# Patient Record
Sex: FEMALE | Race: BLACK OR AFRICAN AMERICAN | NOT HISPANIC OR LATINO | Employment: OTHER | URBAN - METROPOLITAN AREA
[De-identification: names, ages, dates, MRNs, and addresses within clinical notes are randomized per-mention and may not be internally consistent; named-entity substitution may affect disease eponyms.]

---

## 2022-11-14 ENCOUNTER — TELEPHONE (OUTPATIENT)
Dept: PODIATRY | Facility: CLINIC | Age: 67
End: 2022-11-14
Payer: MEDICARE

## 2022-11-14 NOTE — TELEPHONE ENCOUNTER
Patient was called regarding the need to reschedule their November 15, 2022 appointment with . Patient didn't answer but a detailed message was left stating that her appointment will be cancelled with the need of being reschedule.    Appointment was left as is but will be cancelled.

## 2022-12-07 ENCOUNTER — LAB VISIT (OUTPATIENT)
Dept: LAB | Facility: HOSPITAL | Age: 67
End: 2022-12-07
Attending: FAMILY MEDICINE
Payer: MEDICARE

## 2022-12-07 ENCOUNTER — OFFICE VISIT (OUTPATIENT)
Dept: PODIATRY | Facility: CLINIC | Age: 67
End: 2022-12-07
Payer: MEDICARE

## 2022-12-07 VITALS — HEIGHT: 65 IN | BODY MASS INDEX: 36.8 KG/M2 | WEIGHT: 220.88 LBS

## 2022-12-07 DIAGNOSIS — I10 ESSENTIAL HYPERTENSION, MALIGNANT: ICD-10-CM

## 2022-12-07 DIAGNOSIS — Z12.11 SPECIAL SCREENING FOR MALIGNANT NEOPLASMS, COLON: ICD-10-CM

## 2022-12-07 DIAGNOSIS — Q66.70 CAVUS DEFORMITY OF FOOT: Primary | ICD-10-CM

## 2022-12-07 DIAGNOSIS — Z00.01 ENCOUNTER FOR GENERAL ADULT MEDICAL EXAMINATION WITH ABNORMAL FINDINGS: Primary | ICD-10-CM

## 2022-12-07 LAB
ALBUMIN SERPL BCP-MCNC: 4.3 G/DL (ref 3.5–5.2)
ALP SERPL-CCNC: 113 U/L (ref 55–135)
ALT SERPL W/O P-5'-P-CCNC: 12 U/L (ref 10–44)
ANION GAP SERPL CALC-SCNC: 11 MMOL/L (ref 8–16)
AST SERPL-CCNC: 14 U/L (ref 10–40)
BASOPHILS # BLD AUTO: 0.03 K/UL (ref 0–0.2)
BASOPHILS NFR BLD: 0.4 % (ref 0–1.9)
BILIRUB SERPL-MCNC: 0.6 MG/DL (ref 0.1–1)
BUN SERPL-MCNC: 12 MG/DL (ref 8–23)
CALCIUM SERPL-MCNC: 10.1 MG/DL (ref 8.7–10.5)
CHLORIDE SERPL-SCNC: 103 MMOL/L (ref 95–110)
CHOLEST SERPL-MCNC: 219 MG/DL (ref 120–199)
CHOLEST/HDLC SERPL: 5.5 {RATIO} (ref 2–5)
CO2 SERPL-SCNC: 25 MMOL/L (ref 23–29)
CREAT SERPL-MCNC: 0.8 MG/DL (ref 0.5–1.4)
DIFFERENTIAL METHOD: ABNORMAL
EOSINOPHIL # BLD AUTO: 0.1 K/UL (ref 0–0.5)
EOSINOPHIL NFR BLD: 1.1 % (ref 0–8)
ERYTHROCYTE [DISTWIDTH] IN BLOOD BY AUTOMATED COUNT: 13.1 % (ref 11.5–14.5)
EST. GFR  (NO RACE VARIABLE): >60 ML/MIN/1.73 M^2
GLUCOSE SERPL-MCNC: 88 MG/DL (ref 70–110)
HCT VFR BLD AUTO: 42.2 % (ref 37–48.5)
HDLC SERPL-MCNC: 40 MG/DL (ref 40–75)
HDLC SERPL: 18.3 % (ref 20–50)
HGB BLD-MCNC: 13.3 G/DL (ref 12–16)
IMM GRANULOCYTES # BLD AUTO: 0.03 K/UL (ref 0–0.04)
IMM GRANULOCYTES NFR BLD AUTO: 0.4 % (ref 0–0.5)
LDLC SERPL CALC-MCNC: 152.4 MG/DL (ref 63–159)
LYMPHOCYTES # BLD AUTO: 2.7 K/UL (ref 1–4.8)
LYMPHOCYTES NFR BLD: 34.4 % (ref 18–48)
MCH RBC QN AUTO: 28.9 PG (ref 27–31)
MCHC RBC AUTO-ENTMCNC: 31.5 G/DL (ref 32–36)
MCV RBC AUTO: 92 FL (ref 82–98)
MONOCYTES # BLD AUTO: 0.7 K/UL (ref 0.3–1)
MONOCYTES NFR BLD: 8.9 % (ref 4–15)
NEUTROPHILS # BLD AUTO: 4.4 K/UL (ref 1.8–7.7)
NEUTROPHILS NFR BLD: 54.8 % (ref 38–73)
NONHDLC SERPL-MCNC: 179 MG/DL
NRBC BLD-RTO: 0 /100 WBC
PLATELET # BLD AUTO: 433 K/UL (ref 150–450)
PMV BLD AUTO: 10.1 FL (ref 9.2–12.9)
POTASSIUM SERPL-SCNC: 3.9 MMOL/L (ref 3.5–5.1)
PROT SERPL-MCNC: 8.4 G/DL (ref 6–8.4)
RBC # BLD AUTO: 4.61 M/UL (ref 4–5.4)
SODIUM SERPL-SCNC: 139 MMOL/L (ref 136–145)
TRIGL SERPL-MCNC: 133 MG/DL (ref 30–150)
TSH SERPL DL<=0.005 MIU/L-ACNC: 2.3 UIU/ML (ref 0.4–4)
WBC # BLD AUTO: 7.94 K/UL (ref 3.9–12.7)

## 2022-12-07 PROCEDURE — 99203 OFFICE O/P NEW LOW 30 MIN: CPT | Mod: S$GLB,,, | Performed by: PODIATRIST

## 2022-12-07 PROCEDURE — 82272 OCCULT BLD FECES 1-3 TESTS: CPT | Mod: HCWC

## 2022-12-07 PROCEDURE — 1159F MED LIST DOCD IN RCRD: CPT | Mod: CPTII,S$GLB,, | Performed by: PODIATRIST

## 2022-12-07 PROCEDURE — 84443 ASSAY THYROID STIM HORMONE: CPT | Mod: HCWC | Performed by: FAMILY MEDICINE

## 2022-12-07 PROCEDURE — 99203 PR OFFICE/OUTPT VISIT, NEW, LEVL III, 30-44 MIN: ICD-10-PCS | Mod: S$GLB,,, | Performed by: PODIATRIST

## 2022-12-07 PROCEDURE — 80061 LIPID PANEL: CPT | Mod: HCWC | Performed by: FAMILY MEDICINE

## 2022-12-07 PROCEDURE — 99999 PR PBB SHADOW E&M-EST. PATIENT-LVL II: CPT | Mod: PBBFAC,HCWC,, | Performed by: PODIATRIST

## 2022-12-07 PROCEDURE — 99999 PR PBB SHADOW E&M-EST. PATIENT-LVL II: ICD-10-PCS | Mod: PBBFAC,HCWC,, | Performed by: PODIATRIST

## 2022-12-07 PROCEDURE — 36415 COLL VENOUS BLD VENIPUNCTURE: CPT | Mod: HCWC | Performed by: FAMILY MEDICINE

## 2022-12-07 PROCEDURE — 1159F PR MEDICATION LIST DOCUMENTED IN MEDICAL RECORD: ICD-10-PCS | Mod: CPTII,S$GLB,, | Performed by: PODIATRIST

## 2022-12-07 PROCEDURE — 3008F BODY MASS INDEX DOCD: CPT | Mod: CPTII,S$GLB,, | Performed by: PODIATRIST

## 2022-12-07 PROCEDURE — 85025 COMPLETE CBC W/AUTO DIFF WBC: CPT | Mod: HCWC | Performed by: FAMILY MEDICINE

## 2022-12-07 PROCEDURE — 3008F PR BODY MASS INDEX (BMI) DOCUMENTED: ICD-10-PCS | Mod: CPTII,S$GLB,, | Performed by: PODIATRIST

## 2022-12-07 PROCEDURE — 80053 COMPREHEN METABOLIC PANEL: CPT | Mod: HCWC | Performed by: FAMILY MEDICINE

## 2022-12-07 NOTE — PROGRESS NOTES
Subjective:      Patient ID: Jailyn Braun is a 67 y.o. female.    Chief Complaint: Ingrown Toenail (Bilateral ingrown toenail pain ), Plantar Fasciitis (Bilateral pain. ), and Ankle Pain    Jailyn is a 67 y.o. female who presents to the podiatry clinic  with complaint of  bilateral foot pain. Onset of the symptoms was several years ago. Precipitating event: none known. Current symptoms include: ability to bear weight, but with some pain. Aggravating factors: any weight bearing. Symptoms have waxed and waned. Patient has had prior foot problems. Evaluation to date: none. Treatment to date: none. Patients rates pain 4/10 on pain scale.    Review of Systems   Constitutional: Negative for chills, fever and malaise/fatigue.   HENT:  Negative for hearing loss.    Cardiovascular:  Positive for leg swelling. Negative for claudication.   Respiratory:  Negative for shortness of breath.    Skin:  Negative for flushing and rash.   Musculoskeletal:  Positive for joint pain. Negative for myalgias.   Neurological:  Negative for loss of balance, numbness, paresthesias and sensory change.   Psychiatric/Behavioral:  Negative for altered mental status.          Objective:      Physical Exam  Vitals reviewed.   Cardiovascular:      Pulses:           Dorsalis pedis pulses are 2+ on the right side and 2+ on the left side.        Posterior tibial pulses are 2+ on the right side and 2+ on the left side.      Comments: No edema noted b/L  Musculoskeletal:      Comments: Increased height of medial arches noted b/L           Feet:      Right foot:      Protective Sensation: 5 sites tested.  5 sites sensed.      Left foot:      Protective Sensation: 5 sites tested.  5 sites sensed.   Skin:     Capillary Refill: Capillary refill takes 2 to 3 seconds.      Comments: Normal skin tugor noted.   No open lesion noted b/L  Skin temp is warm to warm from proximal to distal b/L.  Webspaces clean, dry, and intact     Neurological:      Mental Status:  She is alert and oriented to person, place, and time.      Comments: Gross sensation intact b/L           Assessment:       Encounter Diagnosis   Name Primary?    Cavus deformity of foot Yes         Plan:       Jailyn was seen today for ingrown toenail, plantar fasciitis and ankle pain.    Diagnoses and all orders for this visit:    Cavus deformity of foot  -     ORTHOTIC DEVICE (DME)      I counseled the patient on her conditions, their implications and medical management.    Pt advised that the pain in her feet is likely caused by cavus foot type.   Rx custom orthotics   Shoe modification advised for the pt. Pt was advised to obtain shoes will accommodate foot deformities.   Call or return to clinic prn if these symptoms worsen or fail to improve as anticipated.      .

## 2023-01-07 LAB — OB PNL STL: NEGATIVE

## 2023-01-31 ENCOUNTER — OFFICE VISIT (OUTPATIENT)
Dept: SPINE | Facility: CLINIC | Age: 68
End: 2023-01-31
Payer: MEDICARE

## 2023-01-31 VITALS
SYSTOLIC BLOOD PRESSURE: 159 MMHG | OXYGEN SATURATION: 97 % | HEART RATE: 80 BPM | HEIGHT: 65 IN | DIASTOLIC BLOOD PRESSURE: 75 MMHG | BODY MASS INDEX: 36.48 KG/M2 | WEIGHT: 218.94 LBS

## 2023-01-31 DIAGNOSIS — M50.30 DDD (DEGENERATIVE DISC DISEASE), CERVICAL: ICD-10-CM

## 2023-01-31 DIAGNOSIS — M54.6 CHRONIC RIGHT-SIDED THORACIC BACK PAIN: ICD-10-CM

## 2023-01-31 DIAGNOSIS — M54.2 CERVICALGIA: Primary | ICD-10-CM

## 2023-01-31 DIAGNOSIS — M47.812 SPONDYLOSIS OF CERVICAL REGION WITHOUT MYELOPATHY OR RADICULOPATHY: ICD-10-CM

## 2023-01-31 DIAGNOSIS — G89.29 CHRONIC RIGHT-SIDED THORACIC BACK PAIN: ICD-10-CM

## 2023-01-31 DIAGNOSIS — M79.18 MYOFASCIAL PAIN: ICD-10-CM

## 2023-01-31 PROCEDURE — 99204 PR OFFICE/OUTPT VISIT, NEW, LEVL IV, 45-59 MIN: ICD-10-PCS | Mod: HCWC,S$GLB,, | Performed by: NURSE PRACTITIONER

## 2023-01-31 PROCEDURE — 3008F BODY MASS INDEX DOCD: CPT | Mod: HCWC,CPTII,S$GLB, | Performed by: NURSE PRACTITIONER

## 2023-01-31 PROCEDURE — 1125F AMNT PAIN NOTED PAIN PRSNT: CPT | Mod: HCWC,CPTII,S$GLB, | Performed by: NURSE PRACTITIONER

## 2023-01-31 PROCEDURE — 1125F PR PAIN SEVERITY QUANTIFIED, PAIN PRESENT: ICD-10-PCS | Mod: HCWC,CPTII,S$GLB, | Performed by: NURSE PRACTITIONER

## 2023-01-31 PROCEDURE — 99999 PR PBB SHADOW E&M-EST. PATIENT-LVL IV: CPT | Mod: PBBFAC,HCWC,, | Performed by: NURSE PRACTITIONER

## 2023-01-31 PROCEDURE — 1101F PT FALLS ASSESS-DOCD LE1/YR: CPT | Mod: HCWC,CPTII,S$GLB, | Performed by: NURSE PRACTITIONER

## 2023-01-31 PROCEDURE — 1101F PR PT FALLS ASSESS DOC 0-1 FALLS W/OUT INJ PAST YR: ICD-10-PCS | Mod: HCWC,CPTII,S$GLB, | Performed by: NURSE PRACTITIONER

## 2023-01-31 PROCEDURE — 1160F RVW MEDS BY RX/DR IN RCRD: CPT | Mod: HCWC,CPTII,S$GLB, | Performed by: NURSE PRACTITIONER

## 2023-01-31 PROCEDURE — 3288F PR FALLS RISK ASSESSMENT DOCUMENTED: ICD-10-PCS | Mod: HCWC,CPTII,S$GLB, | Performed by: NURSE PRACTITIONER

## 2023-01-31 PROCEDURE — 3008F PR BODY MASS INDEX (BMI) DOCUMENTED: ICD-10-PCS | Mod: HCWC,CPTII,S$GLB, | Performed by: NURSE PRACTITIONER

## 2023-01-31 PROCEDURE — 1159F PR MEDICATION LIST DOCUMENTED IN MEDICAL RECORD: ICD-10-PCS | Mod: HCWC,CPTII,S$GLB, | Performed by: NURSE PRACTITIONER

## 2023-01-31 PROCEDURE — 3078F PR MOST RECENT DIASTOLIC BLOOD PRESSURE < 80 MM HG: ICD-10-PCS | Mod: HCWC,CPTII,S$GLB, | Performed by: NURSE PRACTITIONER

## 2023-01-31 PROCEDURE — 3077F PR MOST RECENT SYSTOLIC BLOOD PRESSURE >= 140 MM HG: ICD-10-PCS | Mod: HCWC,CPTII,S$GLB, | Performed by: NURSE PRACTITIONER

## 2023-01-31 PROCEDURE — 3288F FALL RISK ASSESSMENT DOCD: CPT | Mod: HCWC,CPTII,S$GLB, | Performed by: NURSE PRACTITIONER

## 2023-01-31 PROCEDURE — 3078F DIAST BP <80 MM HG: CPT | Mod: HCWC,CPTII,S$GLB, | Performed by: NURSE PRACTITIONER

## 2023-01-31 PROCEDURE — 1159F MED LIST DOCD IN RCRD: CPT | Mod: HCWC,CPTII,S$GLB, | Performed by: NURSE PRACTITIONER

## 2023-01-31 PROCEDURE — 99204 OFFICE O/P NEW MOD 45 MIN: CPT | Mod: HCWC,S$GLB,, | Performed by: NURSE PRACTITIONER

## 2023-01-31 PROCEDURE — 1160F PR REVIEW ALL MEDS BY PRESCRIBER/CLIN PHARMACIST DOCUMENTED: ICD-10-PCS | Mod: HCWC,CPTII,S$GLB, | Performed by: NURSE PRACTITIONER

## 2023-01-31 PROCEDURE — 3077F SYST BP >= 140 MM HG: CPT | Mod: HCWC,CPTII,S$GLB, | Performed by: NURSE PRACTITIONER

## 2023-01-31 PROCEDURE — 99999 PR PBB SHADOW E&M-EST. PATIENT-LVL IV: ICD-10-PCS | Mod: PBBFAC,HCWC,, | Performed by: NURSE PRACTITIONER

## 2023-01-31 RX ORDER — TIZANIDINE 4 MG/1
2-4 TABLET ORAL NIGHTLY PRN
Qty: 30 TABLET | Refills: 1 | Status: SHIPPED | OUTPATIENT
Start: 2023-01-31 | End: 2023-04-01

## 2023-01-31 NOTE — PROGRESS NOTES
Subjective:      Patient ID: Jailyn Braun is a 67 y.o. female.    Chief Complaint: No chief complaint on file.    HPI     Past Medical History:   Diagnosis Date    Hypertension        Past Surgical History:   Procedure Laterality Date    HYSTERECTOMY  1996    TONSILLECTOMY         Family History   Problem Relation Age of Onset    Heart disease Mother         chf    Autoimmune disease Mother     Stroke Father     Heart disease Father         mi    Lupus Sister        Social History     Socioeconomic History    Marital status: Single   Occupational History     Employer: OTHER   Tobacco Use    Smoking status: Never    Smokeless tobacco: Never    Tobacco comments:     quit 27 years ago   Substance and Sexual Activity    Alcohol use: Yes     Alcohol/week: 1.0 standard drink     Types: 1 Cans of beer per week     Comment: once a month     Drug use: No       Current Outpatient Medications   Medication Sig Dispense Refill    amlodipine (NORVASC) 5 MG tablet Take 1 tablet (5 mg total) by mouth once daily. 30 tablet 5    celecoxib (CELEBREX) 200 MG capsule 1 tab po q day 30 capsule 2    cyclobenzaprine (FLEXERIL) 10 MG tablet Half-1 tab po qhs for muscle contraction 30 tablet 2    hydrochlorothiazide (HYDRODIURIL) 25 MG tablet Take 25 mg by mouth once daily.       No current facility-administered medications for this visit.       Review of patient's allergies indicates:   Allergen Reactions    Morphine Hives and Rash    Sulfa (sulfonamide antibiotics) Hives and Rash    Codeine Hives and Rash         Review of Systems   Constitutional: Negative for fever.   Cardiovascular:  Negative for chest pain.   Respiratory:  Negative for shortness of breath.    Musculoskeletal:  Positive for back pain (mid back) and neck pain.   Gastrointestinal:  Negative for abdominal pain, bowel incontinence, nausea and vomiting.   Genitourinary:  Negative for bladder incontinence.   Neurological:  Positive for numbness and paresthesias.        Objective:        General: Jailyn is well-developed, well-nourished, appears stated age, in no acute distress, alert and oriented to time, place and person.     General    Vitals reviewed.  Constitutional: She is oriented to person, place, and time. She appears well-developed and well-nourished.   HENT:   Head: Atraumatic.   Nose: Nose normal.   Eyes: Conjunctivae are normal.   Cardiovascular:  Normal rate.            Pulmonary/Chest: Effort normal.   Neurological: She is alert and oriented to person, place, and time.   Psychiatric: She has a normal mood and affect. Her behavior is normal. Judgment and thought content normal.     General Musculoskeletal Exam   Gait: normal     Back (L-Spine & T-Spine) / Neck (C-Spine) Exam     Tenderness   The patient is tender to palpation of the right trapezial and left trapezial. Right paramedian tenderness of the Lower C-Spine and Lower T-Spine. Left paramedian tenderness of the Lower C-Spine.     Neck (C-Spine) Range of Motion   Flexion:      Limited  Extension:  Limited  Right Lateral Bend: abnormal  Left Lateral Bend: abnormal  Right Rotation: abnormal  Left Rotation: abnormal    Spinal Sensation   Right Side Sensation  C-Spine Level: normal   Left Side Sensation  C-Spine Level: normal    Other   She has no scoliosis .  Spinal Kyphosis:  Absent      Muscle Strength   Right Upper Extremity   Biceps: 5/5   Deltoid:  5/5  Triceps:  5/5  : 5/5   Finger Extensors:  5/5  Left Upper Extremity  Biceps: 5/5   Deltoid:  5/5  Triceps:  5/5  :  5/5   Finger Extensors:  5/5  Right Lower Extremity   Hip Flexion: 5/5   Quadriceps:  5/5   Anterior tibial:  5/5   EHL:  5/5  Left Lower Extremity   Hip Flexion: 5/5   Quadriceps:  5/5   Anterior tibial:  5/5   EHL:  5/5    Reflexes     Left Side  Biceps:  2+  Brachioradialis:  2+  Left Ho's Sign:  Absent    Right Side   Biceps:  2+  Brachioradialis:  2+  Right Ho's Sign:  absent    Vascular Exam     Right  Pulses        Carotid:                  2+    Left Pulses        Carotid:                  2+            Assessment:       1. Cervicalgia    2. Chronic right-sided thoracic back pain    3. Spondylosis of cervical region without myelopathy or radiculopathy    4. DDD (degenerative disc disease), cervical    5. Myofascial pain           Plan:          We discussed neck and back pain and the nature of neck and back pain.  We discussed that it is not one thing that causes the pain but an accumulation of multiple things that we do.    Order cervical and thoracic xrays   Referral to physical therapy for evaluation and treatment of neck and midback pain  Rx zanaflex 2-4mg as needed for muscle pain. Cautioned about potential drowsiness  We discussed posture sitting and the importance of trying to sit better.    We discussed the benefits of therapy and exercise and continuing to move.   RTC for followup in 8 weeks    More than 50% of the total time  of 45 minutes was spent face to face in counseling on diagnosis and treatment options. I also counseled patient  on common and most usual side effect of prescribed medications.  I reviewed Primary care , and other specialty's notes to better coordinate patient's care. All questions were answered, and patient voiced understanding.      Follow-up: Follow up in about 8 weeks (around 3/28/2023). If there are any questions prior to this, the patient was instructed to contact the office.

## 2023-02-01 ENCOUNTER — HOSPITAL ENCOUNTER (OUTPATIENT)
Dept: RADIOLOGY | Facility: OTHER | Age: 68
Discharge: HOME | End: 2023-02-01
Attending: NURSE PRACTITIONER
Payer: MEDICARE

## 2023-02-01 DIAGNOSIS — M50.30 DDD (DEGENERATIVE DISC DISEASE), CERVICAL: ICD-10-CM

## 2023-02-01 DIAGNOSIS — M54.2 CERVICALGIA: ICD-10-CM

## 2023-02-01 DIAGNOSIS — G89.29 CHRONIC RIGHT-SIDED THORACIC BACK PAIN: ICD-10-CM

## 2023-02-01 DIAGNOSIS — M47.812 SPONDYLOSIS OF CERVICAL REGION WITHOUT MYELOPATHY OR RADICULOPATHY: ICD-10-CM

## 2023-02-01 DIAGNOSIS — M54.6 CHRONIC RIGHT-SIDED THORACIC BACK PAIN: ICD-10-CM

## 2023-02-01 PROCEDURE — 72052 XR CERVICAL SPINE 5 VIEW WITH FLEX AND EXT: ICD-10-PCS | Mod: 26,HCWC,, | Performed by: RADIOLOGY

## 2023-02-01 PROCEDURE — 72052 X-RAY EXAM NECK SPINE 6/>VWS: CPT | Mod: TC,HCWC,FY

## 2023-02-01 PROCEDURE — 72070 XR THORACIC SPINE AP LATERAL: ICD-10-PCS | Mod: 26,HCWC,, | Performed by: RADIOLOGY

## 2023-02-01 PROCEDURE — 72052 X-RAY EXAM NECK SPINE 6/>VWS: CPT | Mod: 26,HCWC,, | Performed by: RADIOLOGY

## 2023-02-01 PROCEDURE — 72070 X-RAY EXAM THORAC SPINE 2VWS: CPT | Mod: TC,HCWC,FY

## 2023-02-01 PROCEDURE — 72070 X-RAY EXAM THORAC SPINE 2VWS: CPT | Mod: 26,HCWC,, | Performed by: RADIOLOGY

## 2023-02-03 ENCOUNTER — TELEPHONE (OUTPATIENT)
Dept: SPINE | Facility: CLINIC | Age: 68
End: 2023-02-03
Payer: MEDICARE

## 2023-02-03 NOTE — TELEPHONE ENCOUNTER
----- Message from Iliana Crockett sent at 2/3/2023 10:42 AM CST -----  Contact: LAMIN MACE [[id number]]  Type: Call Back      Who called: LAMIN MACE [[id number]]      What is the request in detail: Patient is requesting a call back. Pt states that the medication she is currently taking is not helping with her pain tiZANidine (ZANAFLEX) 4 MG tablet. She states that she is very uncomfortable today. She would like to know if she can get another medication sent over.   Please advise.       Can the clinic reply by [facility]?       Would the patient rather a call back or a response via [facility]?       Best call back number: [phone] (home)       Additional Information: [id number] - [place], [place] - [address]

## 2023-02-03 NOTE — TELEPHONE ENCOUNTER
Contacted Ms. Aparna in regard to her request. There was no answer, left detailed message. Ly is out today. Her request will be presented to her upon her return on Monday and once a response is received she will be contacted.

## 2023-02-07 ENCOUNTER — TELEPHONE (OUTPATIENT)
Dept: SPINE | Facility: CLINIC | Age: 68
End: 2023-02-07
Payer: MEDICARE

## 2023-02-07 NOTE — TELEPHONE ENCOUNTER
----- Message from Ly Kaur NP sent at 2/6/2023  9:11 AM CST -----  Regarding: RE: med change request  We can try adding meloxicam. She needs to get started with PT.     ----- Message -----  From: Verito Rodriguez MA  Sent: 2/6/2023   8:00 AM CST  To: Ly Kaur NP  Subject: med change request                               Who called: LAMIN MACE [[id number]]        What is the request in detail: Patient is requesting a call back. Pt states that the medication she is currently taking is not helping with her pain tiZANidine (ZANAFLEX) 4 MG tablet. She states that she is very uncomfortable today. She would like to know if she can get another medication sent over.   Please advise.         Can the clinic reply by [facility]?         Would the patient rather a call back or a response via [facility]?         Best call back number: [phone] ([place])         Additional Information: [id number] - [place] - [id number]

## 2023-02-07 NOTE — TELEPHONE ENCOUNTER
Contacted and spoke to patient, she was informed that she can take both medications as needed.     Lumbar pain that is intermittent however it is very intense to where she can't stand and cook or clean or anything. She was already a little subdued due to the thoracic and neck and now the back is just causing very intense pain.     . She currently have an order for the Neck and thoracic PT, wondering if she should she have pT for the lumbar pain.     She was informed this matter will be presented to Ly.

## 2023-02-07 NOTE — TELEPHONE ENCOUNTER
----- Message from Rodri Levi sent at 2/7/2023  3:53 PM CST -----  Regarding: Return Call  Contact: LAMIN MACE [[id number]]  Type:  Patient Returning Call    Who Called: LMAIN MACE [[id number]]    Who Left Message for Patient: Verito    Does the patient know what this is regarding?: yes    Would the patient rather a call back or a response via [facility]? call    Best Call Back Number: [phone]     Additional Information:  yes to moving forward with Meloxicam... is it addition to what she has or discontinue what she has. Hesitation for PT due to lumbar pain, will this be included with her PT. Please advise

## 2023-02-08 ENCOUNTER — TELEPHONE (OUTPATIENT)
Dept: SPINE | Facility: CLINIC | Age: 68
End: 2023-02-08
Payer: MEDICARE

## 2023-02-08 DIAGNOSIS — G89.29 CHRONIC RIGHT-SIDED THORACIC BACK PAIN: ICD-10-CM

## 2023-02-08 DIAGNOSIS — M54.50 LUMBAR BACK PAIN: ICD-10-CM

## 2023-02-08 DIAGNOSIS — M54.2 CERVICALGIA: Primary | ICD-10-CM

## 2023-02-08 DIAGNOSIS — M54.6 CHRONIC RIGHT-SIDED THORACIC BACK PAIN: ICD-10-CM

## 2023-02-08 RX ORDER — MELOXICAM 15 MG/1
15 TABLET ORAL DAILY
Qty: 30 TABLET | Refills: 1 | Status: SHIPPED | OUTPATIENT
Start: 2023-02-08 | End: 2024-02-19

## 2023-02-08 NOTE — TELEPHONE ENCOUNTER
I placed the order for PT for lumbar spine and sent the meloxicam to her pharmacy    ----- Message from Verito Rodriguez MA sent at 2/7/2023  5:01 PM CST -----  Contacted and spoke to patient, she was informed that she can take both medications as needed.      Lumbar pain that is intermittent however it is very intense to where she can't stand and cook or clean or anything. She was already a little subdued due to the thoracic and neck and now the back is just causing very intense pain.      . She currently have an order for the Neck and thoracic PT, wondering if she should she have pT for the lumbar pain.      She was informed this matter will be presented to Ly.       My Note Signed   4:54 PM    Addend  Delete  Copy    ----- Message from Rodri Levi sent at 2/7/2023  3:53 PM CST -----  Regarding: Return Call  Contact: LAMIN MACE [[id number]]  Type:  Patient Returning Call     Who Called: LAMIN MACE [[id number]]     Who Left Message for Patient: Verito     Does the patient know what this is regarding?: yes     Would the patient rather a call back or a response via [facility]? call     Best Call Back Number: [phone]      Additional Information:  yes to moving forward with Meloxicam... is it addition to what she has or discontinue what she has. Hesitation for PT due to lumbar pain, will this be included with her PT. Please advise

## 2023-02-13 ENCOUNTER — PATIENT MESSAGE (OUTPATIENT)
Dept: SPINE | Facility: CLINIC | Age: 68
End: 2023-02-13
Payer: MEDICARE

## 2023-02-13 NOTE — TELEPHONE ENCOUNTER
Staff reached out to patient. Patient was scheduled to come in for a office visit on Wednesday 02/15/2023. AE

## 2023-02-15 ENCOUNTER — OFFICE VISIT (OUTPATIENT)
Dept: SPINE | Facility: CLINIC | Age: 68
End: 2023-02-15
Payer: MEDICARE

## 2023-02-15 ENCOUNTER — HOSPITAL ENCOUNTER (OUTPATIENT)
Dept: RADIOLOGY | Facility: OTHER | Age: 68
Discharge: HOME | End: 2023-02-15
Attending: NURSE PRACTITIONER
Payer: MEDICARE

## 2023-02-15 VITALS
OXYGEN SATURATION: 100 % | DIASTOLIC BLOOD PRESSURE: 90 MMHG | HEART RATE: 81 BPM | WEIGHT: 218 LBS | RESPIRATION RATE: 18 BRPM | SYSTOLIC BLOOD PRESSURE: 190 MMHG | BODY MASS INDEX: 36.28 KG/M2 | TEMPERATURE: 97 F

## 2023-02-15 DIAGNOSIS — M54.9 DORSALGIA, UNSPECIFIED: ICD-10-CM

## 2023-02-15 DIAGNOSIS — M54.6 CHRONIC RIGHT-SIDED THORACIC BACK PAIN: ICD-10-CM

## 2023-02-15 DIAGNOSIS — M47.812 SPONDYLOSIS OF CERVICAL REGION WITHOUT MYELOPATHY OR RADICULOPATHY: ICD-10-CM

## 2023-02-15 DIAGNOSIS — M54.50 LUMBAR BACK PAIN: ICD-10-CM

## 2023-02-15 DIAGNOSIS — M47.817 SPONDYLOSIS WITHOUT MYELOPATHY OR RADICULOPATHY, LUMBOSACRAL REGION: ICD-10-CM

## 2023-02-15 DIAGNOSIS — M50.30 DDD (DEGENERATIVE DISC DISEASE), CERVICAL: ICD-10-CM

## 2023-02-15 DIAGNOSIS — M54.2 CERVICALGIA: Primary | ICD-10-CM

## 2023-02-15 DIAGNOSIS — G89.29 CHRONIC RIGHT-SIDED THORACIC BACK PAIN: ICD-10-CM

## 2023-02-15 PROCEDURE — 72114 XR LUMBAR SPINE 5 VIEW WITH FLEX AND EXT: ICD-10-PCS | Mod: 26,HCWC,, | Performed by: RADIOLOGY

## 2023-02-15 PROCEDURE — 1101F PR PT FALLS ASSESS DOC 0-1 FALLS W/OUT INJ PAST YR: ICD-10-PCS | Mod: HCWC,CPTII,S$GLB, | Performed by: NURSE PRACTITIONER

## 2023-02-15 PROCEDURE — 99999 PR PBB SHADOW E&M-EST. PATIENT-LVL III: ICD-10-PCS | Mod: PBBFAC,HCWC,, | Performed by: NURSE PRACTITIONER

## 2023-02-15 PROCEDURE — 1159F PR MEDICATION LIST DOCUMENTED IN MEDICAL RECORD: ICD-10-PCS | Mod: HCWC,CPTII,S$GLB, | Performed by: NURSE PRACTITIONER

## 2023-02-15 PROCEDURE — 1160F PR REVIEW ALL MEDS BY PRESCRIBER/CLIN PHARMACIST DOCUMENTED: ICD-10-PCS | Mod: HCWC,CPTII,S$GLB, | Performed by: NURSE PRACTITIONER

## 2023-02-15 PROCEDURE — 3080F PR MOST RECENT DIASTOLIC BLOOD PRESSURE >= 90 MM HG: ICD-10-PCS | Mod: HCWC,CPTII,S$GLB, | Performed by: NURSE PRACTITIONER

## 2023-02-15 PROCEDURE — 1159F MED LIST DOCD IN RCRD: CPT | Mod: HCWC,CPTII,S$GLB, | Performed by: NURSE PRACTITIONER

## 2023-02-15 PROCEDURE — 1101F PT FALLS ASSESS-DOCD LE1/YR: CPT | Mod: HCWC,CPTII,S$GLB, | Performed by: NURSE PRACTITIONER

## 2023-02-15 PROCEDURE — 3008F PR BODY MASS INDEX (BMI) DOCUMENTED: ICD-10-PCS | Mod: HCWC,CPTII,S$GLB, | Performed by: NURSE PRACTITIONER

## 2023-02-15 PROCEDURE — 3077F SYST BP >= 140 MM HG: CPT | Mod: HCWC,CPTII,S$GLB, | Performed by: NURSE PRACTITIONER

## 2023-02-15 PROCEDURE — 72114 X-RAY EXAM L-S SPINE BENDING: CPT | Mod: TC,HCWC,FY

## 2023-02-15 PROCEDURE — 3288F PR FALLS RISK ASSESSMENT DOCUMENTED: ICD-10-PCS | Mod: HCWC,CPTII,S$GLB, | Performed by: NURSE PRACTITIONER

## 2023-02-15 PROCEDURE — 3077F PR MOST RECENT SYSTOLIC BLOOD PRESSURE >= 140 MM HG: ICD-10-PCS | Mod: HCWC,CPTII,S$GLB, | Performed by: NURSE PRACTITIONER

## 2023-02-15 PROCEDURE — 99214 OFFICE O/P EST MOD 30 MIN: CPT | Mod: HCWC,S$GLB,, | Performed by: NURSE PRACTITIONER

## 2023-02-15 PROCEDURE — 1125F AMNT PAIN NOTED PAIN PRSNT: CPT | Mod: HCWC,CPTII,S$GLB, | Performed by: NURSE PRACTITIONER

## 2023-02-15 PROCEDURE — 1160F RVW MEDS BY RX/DR IN RCRD: CPT | Mod: HCWC,CPTII,S$GLB, | Performed by: NURSE PRACTITIONER

## 2023-02-15 PROCEDURE — 3288F FALL RISK ASSESSMENT DOCD: CPT | Mod: HCWC,CPTII,S$GLB, | Performed by: NURSE PRACTITIONER

## 2023-02-15 PROCEDURE — 99214 PR OFFICE/OUTPT VISIT, EST, LEVL IV, 30-39 MIN: ICD-10-PCS | Mod: HCWC,S$GLB,, | Performed by: NURSE PRACTITIONER

## 2023-02-15 PROCEDURE — 99999 PR PBB SHADOW E&M-EST. PATIENT-LVL III: CPT | Mod: PBBFAC,HCWC,, | Performed by: NURSE PRACTITIONER

## 2023-02-15 PROCEDURE — 3080F DIAST BP >= 90 MM HG: CPT | Mod: HCWC,CPTII,S$GLB, | Performed by: NURSE PRACTITIONER

## 2023-02-15 PROCEDURE — 72114 X-RAY EXAM L-S SPINE BENDING: CPT | Mod: 26,HCWC,, | Performed by: RADIOLOGY

## 2023-02-15 PROCEDURE — 3008F BODY MASS INDEX DOCD: CPT | Mod: HCWC,CPTII,S$GLB, | Performed by: NURSE PRACTITIONER

## 2023-02-15 PROCEDURE — 1125F PR PAIN SEVERITY QUANTIFIED, PAIN PRESENT: ICD-10-PCS | Mod: HCWC,CPTII,S$GLB, | Performed by: NURSE PRACTITIONER

## 2023-02-15 RX ORDER — METHYLPREDNISOLONE 4 MG/1
TABLET ORAL
Qty: 21 EACH | Refills: 0 | Status: SHIPPED | OUTPATIENT
Start: 2023-02-15 | End: 2023-03-08

## 2023-02-15 NOTE — PROGRESS NOTES
Subjective:      Patient ID: Jailyn Braun is a 67 y.o. female.    Chief Complaint: No chief complaint on file.    HPI Ms. Braun presents today for follow-up of back pain.  She reports unchanged neck and back pain, has not been able to start physical therapy yet but plans to call today to schedule.  Notes increased low back pain that radiates to the front of her thighs, denies radicular pain into the legs.  She could not tolerate Zanaflex and meloxicam so stopped taking them    Cervical xray 2023    FINDINGS:  Vertebral body heights are maintained.     Disc spaces are maintained.  Oblique views show bony narrowing of the right C5-6 neural foramen.     AP alignment is anatomic.     No significant prevertebral soft tissue edema.     Impression:     Degenerative change C5-6 as above.     Thoracic xray 2023    FINDINGS:  There may be subtle height loss along the superior endplate of T6, unchanged.  Remaining vertebral body heights are maintained.  Mild disc space narrowing midthoracic spine.  AP alignment is anatomic.  Levoconvex curvature of the thoracic spine.     Impression:     No acute osseous abnormality seen.    Past Medical History:   Diagnosis Date    Hypertension        Past Surgical History:   Procedure Laterality Date    HYSTERECTOMY  1996    TONSILLECTOMY         Family History   Problem Relation Age of Onset    Heart disease Mother         chf    Autoimmune disease Mother     Stroke Father     Heart disease Father         mi    Lupus Sister        Social History     Socioeconomic History    Marital status: Single   Occupational History     Employer: OTHER   Tobacco Use    Smoking status: Never    Smokeless tobacco: Never    Tobacco comments:     quit 27 years ago   Substance and Sexual Activity    Alcohol use: Yes     Alcohol/week: 1.0 standard drink     Types: 1 Cans of beer per week     Comment: once a month     Drug use: No       Current Outpatient Medications   Medication Sig Dispense Refill     hydrochlorothiazide (HYDRODIURIL) 25 MG tablet Take 25 mg by mouth once daily.      meloxicam (MOBIC) 15 MG tablet Take 1 tablet (15 mg total) by mouth once daily. 30 tablet 1    tiZANidine (ZANAFLEX) 4 MG tablet Take 0.5-1 tablets (2-4 mg total) by mouth nightly as needed (muscle pain). 30 tablet 1    amlodipine (NORVASC) 5 MG tablet Take 1 tablet (5 mg total) by mouth once daily. 30 tablet 5     No current facility-administered medications for this visit.       Review of patient's allergies indicates:   Allergen Reactions    Morphine Hives and Rash    Sulfa (sulfonamide antibiotics) Hives and Rash    Codeine Hives and Rash         Review of Systems   Constitutional: Negative for fever.   Cardiovascular:  Negative for chest pain.   Respiratory:  Negative for shortness of breath.    Musculoskeletal:  Positive for back pain (mid-low back) and neck pain.   Gastrointestinal:  Negative for abdominal pain, bowel incontinence, nausea and vomiting.   Genitourinary:  Negative for bladder incontinence.   Neurological:  Positive for numbness and paresthesias.       Objective:        General: Jailyn is well-developed, well-nourished, appears stated age, in no acute distress, alert and oriented to time, place and person.     General    Vitals reviewed.  Constitutional: She is oriented to person, place, and time. She appears well-developed and well-nourished.   HENT:   Head: Atraumatic.   Nose: Nose normal.   Eyes: Conjunctivae are normal.   Cardiovascular:  Normal rate.            Pulmonary/Chest: Effort normal.   Neurological: She is alert and oriented to person, place, and time.   Psychiatric: She has a normal mood and affect. Her behavior is normal. Judgment and thought content normal.     General Musculoskeletal Exam   Gait: normal     Back (L-Spine & T-Spine) / Neck (C-Spine) Exam     Tenderness   The patient is tender to palpation of the right trapezial and left trapezial. Right paramedian tenderness of the Lower  C-Spine, Lower T-Spine and Lower L-Spine. Left paramedian tenderness of the Lower C-Spine and Lower L-Spine.     Neck (C-Spine) Range of Motion   Flexion:      Limited  Extension:  Limited  Right Lateral Bend: abnormal  Left Lateral Bend: abnormal  Right Rotation: abnormal  Left Rotation: abnormal    Spinal Sensation   Right Side Sensation  C-Spine Level: normal   Left Side Sensation  C-Spine Level: normal    Other   She has no scoliosis .  Spinal Kyphosis:  Absent      Muscle Strength   Right Upper Extremity   Biceps: 5/5   Deltoid:  5/5  Triceps:  5/5  : 5/5   Finger Extensors:  5/5  Left Upper Extremity  Biceps: 5/5   Deltoid:  5/5  Triceps:  5/5  :  5/5   Finger Extensors:  5/5  Right Lower Extremity   Hip Flexion: 5/5   Quadriceps:  5/5   Anterior tibial:  5/5   EHL:  5/5  Left Lower Extremity   Hip Flexion: 5/5   Quadriceps:  5/5   Anterior tibial:  5/5   EHL:  5/5    Reflexes     Left Side  Biceps:  2+  Brachioradialis:  2+  Left Ho's Sign:  Absent    Right Side   Biceps:  2+  Brachioradialis:  2+  Right Ho's Sign:  absent    Vascular Exam     Right Pulses        Carotid:                  2+    Left Pulses        Carotid:                  2+            Assessment:       1. Cervicalgia    2. Dorsalgia, unspecified    3. Spondylosis without myelopathy or radiculopathy, lumbosacral region    4. Chronic right-sided thoracic back pain    5. Lumbar back pain    6. DDD (degenerative disc disease), cervical    7. Spondylosis of cervical region without myelopathy or radiculopathy             Plan:          Prior imaging and records reviewed today  Complains of unchanged neck and back pain, worsening lumbar spine pain.  Has not yet started physical therapy but plans to schedule soon  Order lumbar x-ray  Start physical therapy for neck and back pain  Rx Medrol Dosepak for pain and inflammation symptoms  We discussed posture sitting and the importance of trying to sit better.    We discussed the  benefits of therapy and exercise and continuing to move.   RTC for followup 3/28/23, already scheduled    Follow-up: No follow-ups on file. If there are any questions prior to this, the patient was instructed to contact the office.

## 2023-04-20 ENCOUNTER — TELEPHONE (OUTPATIENT)
Dept: SPINE | Facility: CLINIC | Age: 68
End: 2023-04-20
Payer: MEDICARE

## 2023-08-17 ENCOUNTER — LAB VISIT (OUTPATIENT)
Dept: LAB | Facility: HOSPITAL | Age: 68
End: 2023-08-17
Payer: MEDICARE

## 2023-08-17 DIAGNOSIS — R53.83 FATIGUE: ICD-10-CM

## 2023-08-17 DIAGNOSIS — E78.5 HYPERLIPEMIA: ICD-10-CM

## 2023-08-17 DIAGNOSIS — I10 ESSENTIAL HYPERTENSION, MALIGNANT: Primary | ICD-10-CM

## 2023-08-17 LAB
ALBUMIN SERPL BCP-MCNC: 4 G/DL (ref 3.5–5.2)
ALP SERPL-CCNC: 81 U/L (ref 55–135)
ALT SERPL W/O P-5'-P-CCNC: 15 U/L (ref 10–44)
ANION GAP SERPL CALC-SCNC: 10 MMOL/L (ref 8–16)
AST SERPL-CCNC: 17 U/L (ref 10–40)
BASOPHILS # BLD AUTO: 0.02 K/UL (ref 0–0.2)
BASOPHILS NFR BLD: 0.2 % (ref 0–1.9)
BILIRUB SERPL-MCNC: 0.8 MG/DL (ref 0.1–1)
BUN SERPL-MCNC: 9 MG/DL (ref 8–23)
CALCIUM SERPL-MCNC: 9.8 MG/DL (ref 8.7–10.5)
CHLORIDE SERPL-SCNC: 104 MMOL/L (ref 95–110)
CHOLEST SERPL-MCNC: 228 MG/DL (ref 120–199)
CHOLEST/HDLC SERPL: 6 {RATIO} (ref 2–5)
CO2 SERPL-SCNC: 27 MMOL/L (ref 23–29)
CREAT SERPL-MCNC: 0.9 MG/DL (ref 0.5–1.4)
DIFFERENTIAL METHOD: NORMAL
EOSINOPHIL # BLD AUTO: 0.1 K/UL (ref 0–0.5)
EOSINOPHIL NFR BLD: 0.6 % (ref 0–8)
ERYTHROCYTE [DISTWIDTH] IN BLOOD BY AUTOMATED COUNT: 13.2 % (ref 11.5–14.5)
EST. GFR  (NO RACE VARIABLE): >60 ML/MIN/1.73 M^2
GLUCOSE SERPL-MCNC: 87 MG/DL (ref 70–110)
HCT VFR BLD AUTO: 39.8 % (ref 37–48.5)
HDLC SERPL-MCNC: 38 MG/DL (ref 40–75)
HDLC SERPL: 16.7 % (ref 20–50)
HGB BLD-MCNC: 13 G/DL (ref 12–16)
IMM GRANULOCYTES # BLD AUTO: 0.03 K/UL (ref 0–0.04)
IMM GRANULOCYTES NFR BLD AUTO: 0.4 % (ref 0–0.5)
LDLC SERPL CALC-MCNC: 166.4 MG/DL (ref 63–159)
LYMPHOCYTES # BLD AUTO: 2.7 K/UL (ref 1–4.8)
LYMPHOCYTES NFR BLD: 32.9 % (ref 18–48)
MCH RBC QN AUTO: 30 PG (ref 27–31)
MCHC RBC AUTO-ENTMCNC: 32.7 G/DL (ref 32–36)
MCV RBC AUTO: 92 FL (ref 82–98)
MONOCYTES # BLD AUTO: 0.7 K/UL (ref 0.3–1)
MONOCYTES NFR BLD: 8.2 % (ref 4–15)
NEUTROPHILS # BLD AUTO: 4.8 K/UL (ref 1.8–7.7)
NEUTROPHILS NFR BLD: 57.7 % (ref 38–73)
NONHDLC SERPL-MCNC: 190 MG/DL
NRBC BLD-RTO: 0 /100 WBC
PLATELET # BLD AUTO: 355 K/UL (ref 150–450)
PMV BLD AUTO: 9.9 FL (ref 9.2–12.9)
POTASSIUM SERPL-SCNC: 3.9 MMOL/L (ref 3.5–5.1)
PROT SERPL-MCNC: 7.8 G/DL (ref 6–8.4)
RBC # BLD AUTO: 4.34 M/UL (ref 4–5.4)
SODIUM SERPL-SCNC: 141 MMOL/L (ref 136–145)
TRIGL SERPL-MCNC: 118 MG/DL (ref 30–150)
TSH SERPL DL<=0.005 MIU/L-ACNC: 2.15 UIU/ML (ref 0.4–4)
WBC # BLD AUTO: 8.28 K/UL (ref 3.9–12.7)

## 2023-08-17 PROCEDURE — 36415 COLL VENOUS BLD VENIPUNCTURE: CPT | Mod: HCWC

## 2023-08-17 PROCEDURE — 80061 LIPID PANEL: CPT | Mod: HCWC

## 2023-08-17 PROCEDURE — 80053 COMPREHEN METABOLIC PANEL: CPT | Mod: HCWC

## 2023-08-17 PROCEDURE — 85025 COMPLETE CBC W/AUTO DIFF WBC: CPT | Mod: HCWC

## 2023-08-17 PROCEDURE — 84443 ASSAY THYROID STIM HORMONE: CPT | Mod: HCWC

## 2023-11-16 ENCOUNTER — OFFICE VISIT (OUTPATIENT)
Dept: OPTOMETRY | Facility: CLINIC | Age: 68
End: 2023-11-16
Payer: MEDICARE

## 2023-11-16 DIAGNOSIS — H43.812 PVD (POSTERIOR VITREOUS DETACHMENT), LEFT EYE: Primary | ICD-10-CM

## 2023-11-16 PROCEDURE — 1159F PR MEDICATION LIST DOCUMENTED IN MEDICAL RECORD: ICD-10-PCS | Mod: HCWC,CPTII,S$GLB, | Performed by: OPTOMETRIST

## 2023-11-16 PROCEDURE — 1101F PR PT FALLS ASSESS DOC 0-1 FALLS W/OUT INJ PAST YR: ICD-10-PCS | Mod: HCWC,CPTII,S$GLB, | Performed by: OPTOMETRIST

## 2023-11-16 PROCEDURE — 1101F PT FALLS ASSESS-DOCD LE1/YR: CPT | Mod: HCWC,CPTII,S$GLB, | Performed by: OPTOMETRIST

## 2023-11-16 PROCEDURE — 99999 PR PBB SHADOW E&M-EST. PATIENT-LVL II: CPT | Mod: PBBFAC,HCWC,, | Performed by: OPTOMETRIST

## 2023-11-16 PROCEDURE — 92004 COMPRE OPH EXAM NEW PT 1/>: CPT | Mod: HCWC,S$GLB,, | Performed by: OPTOMETRIST

## 2023-11-16 PROCEDURE — 99999 PR PBB SHADOW E&M-EST. PATIENT-LVL II: ICD-10-PCS | Mod: PBBFAC,HCWC,, | Performed by: OPTOMETRIST

## 2023-11-16 PROCEDURE — 3288F PR FALLS RISK ASSESSMENT DOCUMENTED: ICD-10-PCS | Mod: HCWC,CPTII,S$GLB, | Performed by: OPTOMETRIST

## 2023-11-16 PROCEDURE — 1159F MED LIST DOCD IN RCRD: CPT | Mod: HCWC,CPTII,S$GLB, | Performed by: OPTOMETRIST

## 2023-11-16 PROCEDURE — 92004 PR EYE EXAM, NEW PATIENT,COMPREHESV: ICD-10-PCS | Mod: HCWC,S$GLB,, | Performed by: OPTOMETRIST

## 2023-11-16 PROCEDURE — 1126F AMNT PAIN NOTED NONE PRSNT: CPT | Mod: HCWC,CPTII,S$GLB, | Performed by: OPTOMETRIST

## 2023-11-16 PROCEDURE — 3288F FALL RISK ASSESSMENT DOCD: CPT | Mod: HCWC,CPTII,S$GLB, | Performed by: OPTOMETRIST

## 2023-11-16 PROCEDURE — 1126F PR PAIN SEVERITY QUANTIFIED, NO PAIN PRESENT: ICD-10-PCS | Mod: HCWC,CPTII,S$GLB, | Performed by: OPTOMETRIST

## 2023-11-16 NOTE — PROGRESS NOTES
HPI    Floaters OS    Pt reports floaters started Saturday   Pt reports swatting away   Pt reports has had floaters in the past but not like this     Last edited by Sofiya Nichole on 11/16/2023  9:58 AM.            Assessment /Plan     For exam results, see Encounter Report.    PVD (posterior vitreous detachment), left eye  -Reviewed signs and symptoms of retinal detachment. Pt instructed to return to clinic immediately with flashes, floaters, or veil of darkness in vision.  Follow up DFE in 1 mos      RTC full annual

## 2024-01-04 ENCOUNTER — OFFICE VISIT (OUTPATIENT)
Dept: DERMATOLOGY | Facility: CLINIC | Age: 69
End: 2024-01-04
Payer: MEDICARE

## 2024-01-04 DIAGNOSIS — L82.1 SEBORRHEIC KERATOSES: Primary | ICD-10-CM

## 2024-01-04 PROCEDURE — 3288F FALL RISK ASSESSMENT DOCD: CPT | Mod: HCWC,CPTII,S$GLB, | Performed by: DERMATOLOGY

## 2024-01-04 PROCEDURE — 1126F AMNT PAIN NOTED NONE PRSNT: CPT | Mod: HCWC,CPTII,S$GLB, | Performed by: DERMATOLOGY

## 2024-01-04 PROCEDURE — 1101F PT FALLS ASSESS-DOCD LE1/YR: CPT | Mod: HCWC,CPTII,S$GLB, | Performed by: DERMATOLOGY

## 2024-01-04 PROCEDURE — 99202 OFFICE O/P NEW SF 15 MIN: CPT | Mod: HCWC,S$GLB,, | Performed by: DERMATOLOGY

## 2024-01-04 PROCEDURE — 1159F MED LIST DOCD IN RCRD: CPT | Mod: HCWC,CPTII,S$GLB, | Performed by: DERMATOLOGY

## 2024-01-04 PROCEDURE — 99999 PR PBB SHADOW E&M-EST. PATIENT-LVL II: CPT | Mod: PBBFAC,HCWC,, | Performed by: DERMATOLOGY

## 2024-01-04 PROCEDURE — 1160F RVW MEDS BY RX/DR IN RCRD: CPT | Mod: HCWC,CPTII,S$GLB, | Performed by: DERMATOLOGY

## 2024-01-04 NOTE — PROGRESS NOTES
Subjective:      Patient ID:  Jailyn Braun is a 68 y.o. female who presents for   Chief Complaint   Patient presents with    Skin Tags     Face- periorbitals and scattered over body     Patient with new complaint of lesion(s)  Location: face- periorbitals (skin tags) and scattered over body  Duration: approximately 5 months per pt  Symptoms: itching and dark and raised in appearance  Relieving factors/Previous treatments: none    Pt denies any other concerns today.    Pt has no hx of MM or NMSC or familial hx of skin cancers.          Review of Systems   Skin:  Positive for itching.   Hematologic/Lymphatic: Bruises/bleeds easily (bruises easily).       Objective:   Physical Exam   Constitutional: She appears well-developed and well-nourished. No distress.   Neurological: She is alert and oriented to person, place, and time. She is not disoriented.   Psychiatric: She has a normal mood and affect.   Skin:   Areas Examined (abnormalities noted in diagram):   Head / Face Inspection Performed  RUE Inspected  LUE Inspection Performed                 Diagram Legend     Erythematous scaling macule/papule c/w actinic keratosis       Vascular papule c/w angioma      Pigmented verrucoid papule/plaque c/w seborrheic keratosis      Yellow umbilicated papule c/w sebaceous hyperplasia      Irregularly shaped tan macule c/w lentigo     1-2 mm smooth white papules consistent with Milia      Movable subcutaneous cyst with punctum c/w epidermal inclusion cyst      Subcutaneous movable cyst c/w pilar cyst      Firm pink to brown papule c/w dermatofibroma      Pedunculated fleshy papule(s) c/w skin tag(s)      Evenly pigmented macule c/w junctional nevus     Mildly variegated pigmented, slightly irregular-bordered macule c/w mildly atypical nevus      Flesh colored to evenly pigmented papule c/w intradermal nevus       Pink pearly papule/plaque c/w basal cell carcinoma      Erythematous hyperkeratotic cursted plaque c/w SCC       Surgical scar with no sign of skin cancer recurrence      Open and closed comedones      Inflammatory papules and pustules      Verrucoid papule consistent consistent with wart     Erythematous eczematous patches and plaques     Dystrophic onycholytic nail with subungual debris c/w onychomycosis     Umbilicated papule    Erythematous-base heme-crusted tan verrucoid plaque consistent with inflamed seborrheic keratosis     Erythematous Silvery Scaling Plaque c/w Psoriasis     See annotation      Assessment / Plan:        Seborrheic keratoses    These are benign inherited growths without a malignant potential. Reassurance given to patient. No treatment is necessary.            No follow-ups on file.

## 2024-02-19 ENCOUNTER — OFFICE VISIT (OUTPATIENT)
Dept: INTERNAL MEDICINE | Facility: CLINIC | Age: 69
End: 2024-02-19
Payer: MEDICARE

## 2024-02-19 VITALS
HEIGHT: 65 IN | SYSTOLIC BLOOD PRESSURE: 124 MMHG | OXYGEN SATURATION: 97 % | WEIGHT: 218.06 LBS | HEART RATE: 110 BPM | DIASTOLIC BLOOD PRESSURE: 80 MMHG | BODY MASS INDEX: 36.33 KG/M2

## 2024-02-19 DIAGNOSIS — L29.9 PRURITUS: Primary | ICD-10-CM

## 2024-02-19 PROCEDURE — 99203 OFFICE O/P NEW LOW 30 MIN: CPT | Mod: HCWC,S$GLB,, | Performed by: STUDENT IN AN ORGANIZED HEALTH CARE EDUCATION/TRAINING PROGRAM

## 2024-02-19 PROCEDURE — 3074F SYST BP LT 130 MM HG: CPT | Mod: HCWC,CPTII,S$GLB, | Performed by: STUDENT IN AN ORGANIZED HEALTH CARE EDUCATION/TRAINING PROGRAM

## 2024-02-19 PROCEDURE — 99999 PR PBB SHADOW E&M-EST. PATIENT-LVL III: CPT | Mod: PBBFAC,HCWC,, | Performed by: STUDENT IN AN ORGANIZED HEALTH CARE EDUCATION/TRAINING PROGRAM

## 2024-02-19 PROCEDURE — 3008F BODY MASS INDEX DOCD: CPT | Mod: HCWC,CPTII,S$GLB, | Performed by: STUDENT IN AN ORGANIZED HEALTH CARE EDUCATION/TRAINING PROGRAM

## 2024-02-19 PROCEDURE — 1126F AMNT PAIN NOTED NONE PRSNT: CPT | Mod: HCWC,CPTII,S$GLB, | Performed by: STUDENT IN AN ORGANIZED HEALTH CARE EDUCATION/TRAINING PROGRAM

## 2024-02-19 PROCEDURE — 3288F FALL RISK ASSESSMENT DOCD: CPT | Mod: HCWC,CPTII,S$GLB, | Performed by: STUDENT IN AN ORGANIZED HEALTH CARE EDUCATION/TRAINING PROGRAM

## 2024-02-19 PROCEDURE — 1101F PT FALLS ASSESS-DOCD LE1/YR: CPT | Mod: HCWC,CPTII,S$GLB, | Performed by: STUDENT IN AN ORGANIZED HEALTH CARE EDUCATION/TRAINING PROGRAM

## 2024-02-19 PROCEDURE — 1159F MED LIST DOCD IN RCRD: CPT | Mod: HCWC,CPTII,S$GLB, | Performed by: STUDENT IN AN ORGANIZED HEALTH CARE EDUCATION/TRAINING PROGRAM

## 2024-02-19 PROCEDURE — 3079F DIAST BP 80-89 MM HG: CPT | Mod: HCWC,CPTII,S$GLB, | Performed by: STUDENT IN AN ORGANIZED HEALTH CARE EDUCATION/TRAINING PROGRAM

## 2024-02-19 RX ORDER — LOSARTAN POTASSIUM AND HYDROCHLOROTHIAZIDE 25; 100 MG/1; MG/1
1 TABLET ORAL
COMMUNITY
Start: 2023-11-13

## 2024-02-19 RX ORDER — ATORVASTATIN CALCIUM 10 MG/1
10 TABLET, FILM COATED ORAL
COMMUNITY
Start: 2023-11-13

## 2024-02-19 RX ORDER — HYDROXYZINE HYDROCHLORIDE 25 MG/1
25 TABLET, FILM COATED ORAL 3 TIMES DAILY PRN
Qty: 30 TABLET | Refills: 0 | Status: SHIPPED | OUTPATIENT
Start: 2024-02-19 | End: 2024-02-29

## 2024-02-19 NOTE — PROGRESS NOTES
Subjective:       Patient ID: Jailyn Braun is a 68 y.o. female.    Chief Complaint: Urticaria    Urticaria  This is a recurrent problem. The current episode started more than 1 month ago (Occurs on and off since 2020.). The problem has been gradually worsening since onset. The rash is diffuse. The rash is characterized by redness and itchiness. She was exposed to nothing. Pertinent negatives include no anorexia, congestion, cough, diarrhea, eye pain, fatigue, fever, joint pain, rhinorrhea, shortness of breath, sore throat or vomiting. (Denies any other systemic related symptoms.) Past treatments include antihistamine (liquid zinc, neem and stinging nettle.). The treatment provided mild relief.     Currently not having any skin symptoms.  However she does endorse feeling pruritic on her back and abdomen.  She scratches during the encounter.  No reproducible hives or swelling that she has noted at home.  She says that sometimes it is just itchy, where she will continuously scratch.  This has occurred on and off for the last 4 years.  Sometimes similar episodes where it would last a few weeks and then resolve.  She reports known food triggers, foods that contain oxalate or arginine.  Her symptoms resolve with avoidance of these foods.  She denies any recent new products or exposures to new things.  However she does report exposures to foods that may have triggered symptoms.    Tests to Keep You Healthy    Mammogram: DUE  Colon Cancer Screening: DUE  Last Blood Pressure <= 139/89 (2/19/2024): NO      Social History     Social History Narrative    Not on file       Family History   Problem Relation Age of Onset    Heart disease Mother         chf    Autoimmune disease Mother     Stroke Father     Heart disease Father         mi    Lupus Sister        Current Outpatient Medications:     atorvastatin (LIPITOR) 10 MG tablet, Take 10 mg by mouth., Disp: , Rfl:     losartan-hydrochlorothiazide 100-25 mg (HYZAAR) 100-25 mg per  "tablet, Take 1 tablet by mouth., Disp: , Rfl:     hydrOXYzine HCL (ATARAX) 25 MG tablet, Take 1 tablet (25 mg total) by mouth 3 (three) times daily as needed for Itching., Disp: 30 tablet, Rfl: 0    Review of Systems   Constitutional:  Negative for fatigue and fever.   HENT:  Negative for congestion, rhinorrhea and sore throat.    Eyes:  Negative for pain.   Respiratory:  Negative for cough and shortness of breath.    Gastrointestinal:  Negative for anorexia, diarrhea and vomiting.   Musculoskeletal:  Negative for joint pain.       Objective:   /80   Pulse 110   Ht 5' 5" (1.651 m)   Wt 98.9 kg (218 lb 0.6 oz)   SpO2 97%   BMI 36.28 kg/m²      Physical Exam  Vitals and nursing note reviewed.   Constitutional:       General: She is not in acute distress.     Appearance: Normal appearance. She is not ill-appearing, toxic-appearing or diaphoretic.      Comments: She appears itchy in her back and abdomen, she is scratching during encounter.   Eyes:      General:         Right eye: No discharge.         Left eye: No discharge.      Conjunctiva/sclera: Conjunctivae normal.   Pulmonary:      Effort: Pulmonary effort is normal. No respiratory distress.      Breath sounds: No wheezing.   Neurological:      Mental Status: She is alert.   Psychiatric:         Behavior: Behavior normal.         Assessment & Plan   1. Pruritus  -referral to Allergy.  Trial of Atarax for pruritus.  Advised close follow up with PCP.   - hydrOXYzine HCL (ATARAX) 25 MG tablet; Take 1 tablet (25 mg total) by mouth 3 (three) times daily as needed for Itching.  Dispense: 30 tablet; Refill: 0  - Ambulatory referral/consult to Allergy; Future    No follow-ups on file.    Disclaimer:  This note may have been prepared using voice recognition software, it may have not been extensively proofed, as such there could be errors within the text such as sound alike errors.    "

## 2024-02-22 ENCOUNTER — OFFICE VISIT (OUTPATIENT)
Dept: NEPHROLOGY | Facility: CLINIC | Age: 69
End: 2024-02-22
Payer: MEDICARE

## 2024-02-22 VITALS
HEART RATE: 89 BPM | OXYGEN SATURATION: 97 % | BODY MASS INDEX: 35.71 KG/M2 | DIASTOLIC BLOOD PRESSURE: 102 MMHG | WEIGHT: 214.31 LBS | SYSTOLIC BLOOD PRESSURE: 168 MMHG | HEIGHT: 65 IN

## 2024-02-22 DIAGNOSIS — I10 HYPERTENSION, UNSPECIFIED TYPE: Primary | ICD-10-CM

## 2024-02-22 DIAGNOSIS — E66.01 SEVERE OBESITY (BMI 35.0-39.9) WITH COMORBIDITY: ICD-10-CM

## 2024-02-22 PROCEDURE — 1159F MED LIST DOCD IN RCRD: CPT | Mod: HCWC,CPTII,S$GLB, | Performed by: INTERNAL MEDICINE

## 2024-02-22 PROCEDURE — 99203 OFFICE O/P NEW LOW 30 MIN: CPT | Mod: HCWC,S$GLB,, | Performed by: INTERNAL MEDICINE

## 2024-02-22 PROCEDURE — 99999 PR PBB SHADOW E&M-EST. PATIENT-LVL II: CPT | Mod: PBBFAC,HCWC,, | Performed by: INTERNAL MEDICINE

## 2024-02-22 PROCEDURE — 3080F DIAST BP >= 90 MM HG: CPT | Mod: HCWC,CPTII,S$GLB, | Performed by: INTERNAL MEDICINE

## 2024-02-22 PROCEDURE — 3008F BODY MASS INDEX DOCD: CPT | Mod: HCWC,CPTII,S$GLB, | Performed by: INTERNAL MEDICINE

## 2024-02-22 PROCEDURE — 3066F NEPHROPATHY DOC TX: CPT | Mod: HCWC,CPTII,S$GLB, | Performed by: INTERNAL MEDICINE

## 2024-02-22 PROCEDURE — 3077F SYST BP >= 140 MM HG: CPT | Mod: HCWC,CPTII,S$GLB, | Performed by: INTERNAL MEDICINE

## 2024-02-22 PROCEDURE — 1126F AMNT PAIN NOTED NONE PRSNT: CPT | Mod: HCWC,CPTII,S$GLB, | Performed by: INTERNAL MEDICINE

## 2024-02-22 PROCEDURE — 1101F PT FALLS ASSESS-DOCD LE1/YR: CPT | Mod: HCWC,CPTII,S$GLB, | Performed by: INTERNAL MEDICINE

## 2024-02-22 PROCEDURE — 3288F FALL RISK ASSESSMENT DOCD: CPT | Mod: HCWC,CPTII,S$GLB, | Performed by: INTERNAL MEDICINE

## 2024-02-22 NOTE — PROGRESS NOTES
REASON FOR CONSULT: rash    REFERRING PHYSICIAN: Hui Richards MD      HISTORY OF PRESENT ILLNESS: 68 y.o. female who is new to me  has a past medical history of Hypertension. patient was referred here for skin rash   The patient denies taking NSAIDs or new antibiotics, recreational drugs, recent episode of dehydration, diarrhea, nausea or vomiting, acute illness, hospitalization or exposure to IV radiocontrast.     ROS:  General: negative for chills, or fatigue  ENT: No epistaxis or headaches  Hematological and Lymphatic: No bleeding problems or blood clots.  Endocrine: No skin changes or temperature intolerance  Respiratory: No cough, shortness of breath, or wheezing  Cardiovascular: No chest pain or dyspnea   Gastrointestinal: No abdominal pain, change in bowel habits  Genito-Urinary: No dysuria, trouble voiding, or hematuria  Musculoskeletal: ROS: negative for - joint pain, joint stiffness, joint swelling, muscle pain or muscular weakness  Neurological: No focal weakness, no numbness  Dermatological: rash    PAST MEDICAL HISTORY:  Past Medical History:   Diagnosis Date    Hypertension        PAST SURGICAL HISTORY:  Past Surgical History:   Procedure Laterality Date    HYSTERECTOMY  1996    TONSILLECTOMY         FAMILY HISTORY:   Family History   Problem Relation Age of Onset    Heart disease Mother         chf    Autoimmune disease Mother     Stroke Father     Heart disease Father         mi    Lupus Sister        SOCIAL HISTORY:  Social History     Socioeconomic History    Marital status: Single   Occupational History     Employer: OTHER   Tobacco Use    Smoking status: Never    Smokeless tobacco: Never    Tobacco comments:     quit 27 years ago   Substance and Sexual Activity    Alcohol use: Yes     Alcohol/week: 1.0 standard drink of alcohol     Types: 1 Cans of beer per week     Comment: once a month     Drug use: No       ALLERGIES:  Review of patient's allergies indicates:   Allergen Reactions     "Morphine Hives and Rash    Sulfa (sulfonamide antibiotics) Hives and Rash    Codeine Hives and Rash       MEDICATIONS:    Current Outpatient Medications:     atorvastatin (LIPITOR) 10 MG tablet, Take 10 mg by mouth., Disp: , Rfl:     hydrOXYzine HCL (ATARAX) 25 MG tablet, Take 1 tablet (25 mg total) by mouth 3 (three) times daily as needed for Itching., Disp: 30 tablet, Rfl: 0    losartan-hydrochlorothiazide 100-25 mg (HYZAAR) 100-25 mg per tablet, Take 1 tablet by mouth., Disp: , Rfl:    Medication List with Changes/Refills   Current Medications    ATORVASTATIN (LIPITOR) 10 MG TABLET    Take 10 mg by mouth.    HYDROXYZINE HCL (ATARAX) 25 MG TABLET    Take 1 tablet (25 mg total) by mouth 3 (three) times daily as needed for Itching.    LOSARTAN-HYDROCHLOROTHIAZIDE 100-25 MG (HYZAAR) 100-25 MG PER TABLET    Take 1 tablet by mouth.        PHYSICAL EXAM:  There were no vitals taken for this visit.    General: No distress, No fever or chills  Head: Normocephalic,atraumatic  Eyes: conjunctivae/corneas clear. PERRL, EOM's intact.  Nose: Nares normal. Mucosa normal. No drainage or sinus tenderness.  Neck: No adenopathy,no carotid bruit,no JVD  Lungs:Clear to auscultation bilaterally, No Crackles  Heart: Regular rate and rhythm, no murmur, gallops or rubs  Abdomen: Soft, no tenderness, bowel sounds normal  Extremities: Atraumatic, no edema in LE  Skin: Turgor normal. No rashes or ulcers  Neurologic: No focal weakness, oriented.          LABS:  Lab Results   Component Value Date    CREATININE 0.9 08/17/2023       No results found for: "UTPCR"    Lab Results   Component Value Date     08/17/2023    K 3.9 08/17/2023    CO2 27 08/17/2023       last PTH   Lab Results   Component Value Date    CALCIUM 9.8 08/17/2023    PHOS 4.1 03/18/2012       Lab Results   Component Value Date    HGB 13.0 08/17/2023        No results found for: "HGBA1C"    Lab Results   Component Value Date    LDLCALC 166.4 (H) 08/17/2023 "           ASSESSMENT:    1- urticaria : pt thinks its related to the kidney dis . Her kidney function is normal   2- HTN : controlled   -Continue current BP meds regimen          RTC in as needed       Thanks for allowing me to participate in the care of this patient.     9:33 AM    ANTOINETTE CHANDLER MD  NEPHROLOGY ATTENDING       Walk in

## 2024-02-27 ENCOUNTER — OFFICE VISIT (OUTPATIENT)
Dept: OPTOMETRY | Facility: CLINIC | Age: 69
End: 2024-02-27
Payer: MEDICARE

## 2024-02-27 DIAGNOSIS — H52.4 MYOPIA WITH PRESBYOPIA OF BOTH EYES: ICD-10-CM

## 2024-02-27 DIAGNOSIS — Z13.5 GLAUCOMA SCREENING: ICD-10-CM

## 2024-02-27 DIAGNOSIS — H52.13 MYOPIA WITH PRESBYOPIA OF BOTH EYES: ICD-10-CM

## 2024-02-27 DIAGNOSIS — H25.13 NUCLEAR SCLEROSIS, BILATERAL: Primary | ICD-10-CM

## 2024-02-27 PROCEDURE — 92014 COMPRE OPH EXAM EST PT 1/>: CPT | Mod: HCWC,,, | Performed by: OPTOMETRIST

## 2024-02-27 PROCEDURE — 92310 CONTACT LENS FITTING OU: CPT | Mod: CSM,HCWC,S$GLB, | Performed by: OPTOMETRIST

## 2024-02-27 PROCEDURE — 99999 PR PBB SHADOW E&M-EST. PATIENT-LVL II: CPT | Mod: PBBFAC,HCWC,, | Performed by: OPTOMETRIST

## 2024-02-27 PROCEDURE — 92015 DETERMINE REFRACTIVE STATE: CPT | Mod: HCWC,,, | Performed by: OPTOMETRIST

## 2024-02-27 NOTE — PROGRESS NOTES
HPI    Annual Exam and CLs   Pt states vision is stable   Wears Cls 5x/day   Right eye near Monovision     Pt denies Flashes   Pt reports floaters stable    Pt denies Dry/ Itchy/ Burning  Pt reports Watery eyes/ F/B sensation OD>OS  Gtt: No    Annual CL EXAM   Brand: AO Colors   Replacement: 1-2 months  Sleeps in lenses: No  Comfort problems: No  Solution: Optifree     Last edited by Kevin Rodriguez, OD on 2/27/2024  8:03 AM.            Assessment /Plan     For exam results, see Encounter Report.    Nuclear sclerosis, bilateral  -Educated patient on presence of cataracts at today's exam, monitor at annual dilated fundus exam. 5+ years surgical estimate.    Glaucoma screening  -Monitor with annual eye exam and IOP check    Myopia with presbyopia of both eyes  Eyeglass Final Rx       Eyeglass Final Rx         Sphere Cylinder Axis Dist VA Add    Right -1.50 Sphere  20/20 +2.50    Left -2.00 +0.50 175 20/20 +2.50      Type: PAL    Expiration Date: 2/27/2025                  Dispense trials DW, 1 mo, Monovision OD near  Ok to final in Colors  OK to final [place] OD if patient prefers      RTC 1 yr, 1 wk PHREV

## 2024-03-07 ENCOUNTER — TELEPHONE (OUTPATIENT)
Dept: NEPHROLOGY | Facility: CLINIC | Age: 69
End: 2024-03-07
Payer: MEDICARE

## 2024-03-08 ENCOUNTER — LAB VISIT (OUTPATIENT)
Dept: LAB | Facility: HOSPITAL | Age: 69
End: 2024-03-08
Payer: MEDICARE

## 2024-03-08 ENCOUNTER — OFFICE VISIT (OUTPATIENT)
Dept: ALLERGY | Facility: CLINIC | Age: 69
End: 2024-03-08
Payer: MEDICARE

## 2024-03-08 VITALS — BODY MASS INDEX: 36.07 KG/M2 | HEIGHT: 65 IN | WEIGHT: 216.5 LBS

## 2024-03-08 DIAGNOSIS — L50.8 CHRONIC URTICARIA: ICD-10-CM

## 2024-03-08 DIAGNOSIS — L29.9 PRURITUS: ICD-10-CM

## 2024-03-08 DIAGNOSIS — L50.8 CHRONIC URTICARIA: Primary | ICD-10-CM

## 2024-03-08 LAB — ERYTHROCYTE [SEDIMENTATION RATE] IN BLOOD BY PHOTOMETRIC METHOD: 59 MM/HR (ref 0–36)

## 2024-03-08 PROCEDURE — 86038 ANTINUCLEAR ANTIBODIES: CPT | Mod: HCWC | Performed by: ALLERGY & IMMUNOLOGY

## 2024-03-08 PROCEDURE — 84165 PROTEIN E-PHORESIS SERUM: CPT | Mod: 26,,, | Performed by: PATHOLOGY

## 2024-03-08 PROCEDURE — 88184 FLOWCYTOMETRY/ TC 1 MARKER: CPT | Performed by: ALLERGY & IMMUNOLOGY

## 2024-03-08 PROCEDURE — 84165 PROTEIN E-PHORESIS SERUM: CPT | Mod: HCWC | Performed by: ALLERGY & IMMUNOLOGY

## 2024-03-08 PROCEDURE — 3066F NEPHROPATHY DOC TX: CPT | Mod: CPTII,S$GLB,, | Performed by: ALLERGY & IMMUNOLOGY

## 2024-03-08 PROCEDURE — 85652 RBC SED RATE AUTOMATED: CPT | Mod: HCWC | Performed by: ALLERGY & IMMUNOLOGY

## 2024-03-08 PROCEDURE — 99205 OFFICE O/P NEW HI 60 MIN: CPT | Mod: S$GLB,,, | Performed by: ALLERGY & IMMUNOLOGY

## 2024-03-08 PROCEDURE — 36415 COLL VENOUS BLD VENIPUNCTURE: CPT | Mod: HCWC,PO | Performed by: ALLERGY & IMMUNOLOGY

## 2024-03-08 PROCEDURE — 86003 ALLG SPEC IGE CRUDE XTRC EA: CPT | Mod: HCWC | Performed by: ALLERGY & IMMUNOLOGY

## 2024-03-08 PROCEDURE — 1126F AMNT PAIN NOTED NONE PRSNT: CPT | Mod: CPTII,S$GLB,, | Performed by: ALLERGY & IMMUNOLOGY

## 2024-03-08 PROCEDURE — 3008F BODY MASS INDEX DOCD: CPT | Mod: CPTII,S$GLB,, | Performed by: ALLERGY & IMMUNOLOGY

## 2024-03-08 PROCEDURE — 1160F RVW MEDS BY RX/DR IN RCRD: CPT | Mod: CPTII,S$GLB,, | Performed by: ALLERGY & IMMUNOLOGY

## 2024-03-08 PROCEDURE — 86003 ALLG SPEC IGE CRUDE XTRC EA: CPT | Mod: 59,HCWC | Performed by: ALLERGY & IMMUNOLOGY

## 2024-03-08 PROCEDURE — 1159F MED LIST DOCD IN RCRD: CPT | Mod: CPTII,S$GLB,, | Performed by: ALLERGY & IMMUNOLOGY

## 2024-03-08 PROCEDURE — 99999 PR PBB SHADOW E&M-EST. PATIENT-LVL III: CPT | Mod: PBBFAC,HCWC,, | Performed by: ALLERGY & IMMUNOLOGY

## 2024-03-08 RX ORDER — AMLODIPINE BESYLATE 10 MG/1
10 TABLET ORAL DAILY
COMMUNITY

## 2024-03-08 RX ORDER — TRIAMCINOLONE ACETONIDE 1 MG/G
CREAM TOPICAL 2 TIMES DAILY
COMMUNITY
Start: 2024-02-29

## 2024-03-08 NOTE — PROGRESS NOTES
Subjective:       Patient ID: Jailyn Braun is a 68 y.o. female.    Chief Complaint:  Urticaria and Itching      69 yo woman presents for new patient evaluation of hives. She states she had this in 2020, she linked it to too much arginine ingestion so tried to minimize. She was better but would have flares off and on. She also tried to follow low histamine diet. She is still having spots of itch, come and go all over body. If she scratches gets red hives. If she does not scratch goes away faster. No face swelling. She has no rhinitis, no asthma, no SOB. She was better during week then after weekend flared and she thought maybe was from popcorn which has arginine. She also has found sunflower and pistachio as triggers. She has HTN and high cholesterol, no other medical issues.         Environmental History: see history section for home environment  Review of Systems   HENT:  Negative for congestion, facial swelling, postnasal drip, rhinorrhea, sinus pressure and sneezing.    Respiratory:  Negative for cough, chest tightness, shortness of breath and wheezing.    Gastrointestinal:  Negative for abdominal pain.   Skin:  Positive for color change and rash.        Objective:      Physical Exam  Vitals and nursing note reviewed.   Constitutional:       General: She is not in acute distress.     Appearance: Normal appearance. She is not ill-appearing.   HENT:      Nose: No rhinorrhea.   Eyes:      General:         Right eye: No discharge.         Left eye: No discharge.      Conjunctiva/sclera: Conjunctivae normal.   Pulmonary:      Effort: Pulmonary effort is normal. No respiratory distress.   Abdominal:      General: There is no distension.   Skin:     General: Skin is warm and dry.      Findings: No erythema or rash.   Neurological:      Mental Status: She is alert and oriented to person, place, and time.   Psychiatric:         Mood and Affect: Mood normal.         Behavior: Behavior normal.         Laboratory:   none  performed   Assessment:       1. Chronic urticaria    2. Pruritus         Plan:       Urticaria- advised can be allergic but less likely vs systemic vs immune mediated, meza end labs to eval  Start cetirizine 10 gm BID and can increase to 20 mg bid if needed  Phone review    I spent a total of 60 minutes on the day of the visit.  This includes face to face time and non-face to face time preparing to see the patient (eg, review of tests), obtaining and/or reviewing separately obtained history, documenting clinical information in the electronic or other health record, independently interpreting results and communicating results to the patient/family/caregiver, or care coordinator.

## 2024-03-11 LAB
ALBUMIN SERPL ELPH-MCNC: 4.2 G/DL (ref 3.35–5.55)
ALPHA1 GLOB SERPL ELPH-MCNC: 0.33 G/DL (ref 0.17–0.41)
ALPHA2 GLOB SERPL ELPH-MCNC: 0.87 G/DL (ref 0.43–0.99)
ANA SER QL IF: NORMAL
B-GLOBULIN SERPL ELPH-MCNC: 1.02 G/DL (ref 0.5–1.1)
GAMMA GLOB SERPL ELPH-MCNC: 1.38 G/DL (ref 0.67–1.58)
PROT SERPL-MCNC: 7.8 G/DL (ref 6–8.4)

## 2024-03-12 LAB
ALMOND IGE QN: <0.1 KU/L
BLACK PEPPER IGE QN: <0.1 KU/L
CASHEW NUT IGE QN: <0.1 KU/L
CELERY IGE QN: <0.1 KU/L
CHILI PEPPER IGE QN: <0.1 KU/L
COFFEE IGE QN: <0.1 KU/L
CORN IGE QN: <0.1 KU/L
COW MILK IGE QN: 0.41 KU/L
CRAB IGE QN: <0.1 KU/L
CRAWFISH IGE QN: <0.1 KU/L
DEPRECATED ALMOND IGE RAST QL: NORMAL
DEPRECATED BLACK PEPPER IGE RAST QL: NORMAL
DEPRECATED CASHEW NUT IGE RAST QL: NORMAL
DEPRECATED CELERY IGE RAST QL: NORMAL
DEPRECATED CHILI PEPPER IGE RAST QL: NORMAL
DEPRECATED COFFEE IGE RAST QL: NORMAL
DEPRECATED CORN IGE RAST QL: NORMAL
DEPRECATED COW MILK IGE RAST QL: ABNORMAL
DEPRECATED CRAB IGE RAST QL: NORMAL
DEPRECATED CRAWFISH IGE RAST QL: NORMAL
DEPRECATED EGG WHITE IGE RAST QL: ABNORMAL
DEPRECATED GARLIC IGE RAST QL: NORMAL
DEPRECATED GREEN PEPPER IGE RAST QL: NORMAL
DEPRECATED HAZELNUT IGE RAST QL: NORMAL
DEPRECATED MACADAMIA IGE RAST QL: NORMAL
DEPRECATED OAT IGE RAST QL: NORMAL
DEPRECATED PEANUT IGE RAST QL: NORMAL
DEPRECATED PECAN/HICK NUT IGE RAST QL: NORMAL
DEPRECATED PISTACHIO IGE RAST QL: NORMAL
DEPRECATED RICE IGE RAST QL: NORMAL
DEPRECATED SESAME SEED IGE RAST QL: NORMAL
DEPRECATED SHRIMP IGE RAST QL: NORMAL
DEPRECATED SOYBEAN IGE RAST QL: NORMAL
DEPRECATED SUNFLOWER SEED IGE RAST QL: NORMAL
DEPRECATED TOMATO IGE RAST QL: NORMAL
DEPRECATED WHEAT IGE RAST QL: NORMAL
EGG WHITE IGE QN: 0.3 KU/L
GARLIC IGE QN: <0.1 KU/L
GREEN PEPPER IGE QN: <0.1 KU/L
HAZELNUT IGE QN: <0.1 KU/L
MACADAMIA IGE QN: <0.1 KU/L
OAT IGE QN: <0.1 KU/L
PATHOLOGIST INTERPRETATION SPE: NORMAL
PEANUT IGE QN: <0.1 KU/L
PECAN/HICK NUT IGE QN: <0.1 KU/L
PISTACHIO IGE QN: <0.1 KU/L
RICE IGE QN: <0.1 KU/L
SESAME SEED IGE QN: <0.1 KU/L
SHRIMP IGE QN: <0.1 KU/L
SOYBEAN IGE QN: <0.1 KU/L
SUNFLOWER SEED IGE QN: <0.1 KU/L
TOMATO IGE QN: <0.1 KU/L
WHEAT IGE QN: <0.1 KU/L

## 2024-03-14 ENCOUNTER — PATIENT MESSAGE (OUTPATIENT)
Dept: ALLERGY | Facility: CLINIC | Age: 69
End: 2024-03-14
Payer: MEDICARE

## 2024-03-15 ENCOUNTER — PATIENT MESSAGE (OUTPATIENT)
Dept: ALLERGY | Facility: CLINIC | Age: 69
End: 2024-03-15
Payer: MEDICARE

## 2024-03-21 LAB
BASOPHILS %, CD203C: 3.2 % (ref 0–12)
IGE RECEPTOR AB INTERPRETATION:: NORMAL

## 2024-04-05 ENCOUNTER — TELEPHONE (OUTPATIENT)
Dept: OBSTETRICS AND GYNECOLOGY | Facility: CLINIC | Age: 69
End: 2024-04-05
Payer: MEDICARE

## 2024-04-16 ENCOUNTER — PATIENT MESSAGE (OUTPATIENT)
Dept: OPTOMETRY | Facility: CLINIC | Age: 69
End: 2024-04-16
Payer: MEDICARE

## 2024-04-30 ENCOUNTER — OFFICE VISIT (OUTPATIENT)
Dept: OBSTETRICS AND GYNECOLOGY | Facility: CLINIC | Age: 69
End: 2024-04-30
Payer: MEDICARE

## 2024-04-30 ENCOUNTER — LAB VISIT (OUTPATIENT)
Dept: LAB | Facility: OTHER | Age: 69
End: 2024-04-30
Attending: STUDENT IN AN ORGANIZED HEALTH CARE EDUCATION/TRAINING PROGRAM
Payer: MEDICARE

## 2024-04-30 VITALS
SYSTOLIC BLOOD PRESSURE: 118 MMHG | BODY MASS INDEX: 36.06 KG/M2 | DIASTOLIC BLOOD PRESSURE: 78 MMHG | WEIGHT: 216.69 LBS

## 2024-04-30 DIAGNOSIS — R63.5 WEIGHT GAIN: ICD-10-CM

## 2024-04-30 DIAGNOSIS — R63.5 WEIGHT GAIN: Primary | ICD-10-CM

## 2024-04-30 LAB
ESTRADIOL SERPL-MCNC: <10 PG/ML
FSH SERPL-ACNC: 60.49 MIU/ML
LH SERPL-ACNC: 18.6 MIU/ML
TESTOST SERPL-MCNC: 16 NG/DL (ref 5–73)

## 2024-04-30 PROCEDURE — 3288F FALL RISK ASSESSMENT DOCD: CPT | Mod: HCWC,CPTII,S$GLB, | Performed by: STUDENT IN AN ORGANIZED HEALTH CARE EDUCATION/TRAINING PROGRAM

## 2024-04-30 PROCEDURE — 1126F AMNT PAIN NOTED NONE PRSNT: CPT | Mod: HCWC,CPTII,S$GLB, | Performed by: STUDENT IN AN ORGANIZED HEALTH CARE EDUCATION/TRAINING PROGRAM

## 2024-04-30 PROCEDURE — 1159F MED LIST DOCD IN RCRD: CPT | Mod: HCWC,CPTII,S$GLB, | Performed by: STUDENT IN AN ORGANIZED HEALTH CARE EDUCATION/TRAINING PROGRAM

## 2024-04-30 PROCEDURE — 36415 COLL VENOUS BLD VENIPUNCTURE: CPT | Mod: HCWC | Performed by: STUDENT IN AN ORGANIZED HEALTH CARE EDUCATION/TRAINING PROGRAM

## 2024-04-30 PROCEDURE — 84403 ASSAY OF TOTAL TESTOSTERONE: CPT | Mod: HCWC | Performed by: STUDENT IN AN ORGANIZED HEALTH CARE EDUCATION/TRAINING PROGRAM

## 2024-04-30 PROCEDURE — 83001 ASSAY OF GONADOTROPIN (FSH): CPT | Mod: HCWC | Performed by: STUDENT IN AN ORGANIZED HEALTH CARE EDUCATION/TRAINING PROGRAM

## 2024-04-30 PROCEDURE — 3008F BODY MASS INDEX DOCD: CPT | Mod: HCWC,CPTII,S$GLB, | Performed by: STUDENT IN AN ORGANIZED HEALTH CARE EDUCATION/TRAINING PROGRAM

## 2024-04-30 PROCEDURE — 3066F NEPHROPATHY DOC TX: CPT | Mod: HCWC,CPTII,S$GLB, | Performed by: STUDENT IN AN ORGANIZED HEALTH CARE EDUCATION/TRAINING PROGRAM

## 2024-04-30 PROCEDURE — 83002 ASSAY OF GONADOTROPIN (LH): CPT | Mod: HCWC | Performed by: STUDENT IN AN ORGANIZED HEALTH CARE EDUCATION/TRAINING PROGRAM

## 2024-04-30 PROCEDURE — 99999 PR PBB SHADOW E&M-EST. PATIENT-LVL II: CPT | Mod: PBBFAC,HCWC,, | Performed by: STUDENT IN AN ORGANIZED HEALTH CARE EDUCATION/TRAINING PROGRAM

## 2024-04-30 PROCEDURE — 3074F SYST BP LT 130 MM HG: CPT | Mod: HCWC,CPTII,S$GLB, | Performed by: STUDENT IN AN ORGANIZED HEALTH CARE EDUCATION/TRAINING PROGRAM

## 2024-04-30 PROCEDURE — 82670 ASSAY OF TOTAL ESTRADIOL: CPT | Mod: HCWC | Performed by: STUDENT IN AN ORGANIZED HEALTH CARE EDUCATION/TRAINING PROGRAM

## 2024-04-30 PROCEDURE — 1101F PT FALLS ASSESS-DOCD LE1/YR: CPT | Mod: HCWC,CPTII,S$GLB, | Performed by: STUDENT IN AN ORGANIZED HEALTH CARE EDUCATION/TRAINING PROGRAM

## 2024-04-30 PROCEDURE — 3078F DIAST BP <80 MM HG: CPT | Mod: HCWC,CPTII,S$GLB, | Performed by: STUDENT IN AN ORGANIZED HEALTH CARE EDUCATION/TRAINING PROGRAM

## 2024-04-30 PROCEDURE — 99203 OFFICE O/P NEW LOW 30 MIN: CPT | Mod: HCWC,S$GLB,, | Performed by: STUDENT IN AN ORGANIZED HEALTH CARE EDUCATION/TRAINING PROGRAM

## 2024-05-01 NOTE — PROGRESS NOTES
"History & Physical  Gynecology      SUBJECTIVE:     Chief Complaint: Well Woman       History of Present Illness:  Jailyn Braun is a 68 y.o. F who presents for "hormone testing". She states her primary care physician recommended that she see an OBGYN to have her hormone levels checked. She declines a well woman visit today. She states this all stems from an inability to lose weight, particularly central weight. She was having issues with hives at the end of last year and was being worked up for several food allergies. She says that the despite a very limited diet, she did not lose weight. She has slowly been adding foods back to her diet and no food allergy has been diagnosed yet. She denies any other issues -- no abdominal pain, no bloating, no other appetite changes. Sleep and energy levels are normal. Not sexually active.    She had a hysterectomy in the  for fibroids. [facility] knows her ovaries are still present but unsure about cervix. Has not had consistent GYN care in years. 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 [facility] would know if something was off and she needed a GYN exam.      Review of patient's allergies indicates:   Allergen Reactions    Morphine Hives and Rash    Sulfa (sulfonamide antibiotics) Hives and Rash    Codeine Hives and Rash       Past Medical History:   Diagnosis Date    Hypertension     Urticaria      Past Surgical History:   Procedure Laterality Date    ADENOIDECTOMY      HYSTERECTOMY  1996    TONSILLECTOMY       OB History          2    Para   2    Term   2            AB        Living   1         SAB        IAB        Ectopic        Multiple        Live Births   2               Family History   Problem Relation Name Age of Onset    Heart disease Mother          chf    Autoimmune disease Mother      Stroke Father      Heart disease Father          mi    Lupus Sister      Eczema Neg Hx      Asthma Neg Hx      Allergies Neg Hx "       Social History     Tobacco Use    Smoking status: Never    Smokeless tobacco: Never    Tobacco comments:     quit 27 years ago   Substance Use Topics    Alcohol use: Not Currently     Alcohol/week: 1.0 standard drink of alcohol     Types: 1 Cans of beer per week     Comment: once a month     Drug use: No       Current Outpatient Medications   Medication Sig Dispense Refill    atorvastatin (LIPITOR) 10 MG tablet Take 10 mg by mouth.      losartan-hydrochlorothiazide 100-25 mg (HYZAAR) 100-25 mg per tablet Take 1 tablet by mouth.      amLODIPine (NORVASC) 10 MG tablet Take 10 mg by mouth once daily.      triamcinolone acetonide 0.1% (KENALOG) 0.1 % cream Apply topically 2 (two) times daily.       No current facility-administered medications for this visit.         Review of Systems:  Review of Systems   Constitutional:  Negative for fatigue.   Gastrointestinal:  Negative for abdominal pain and bloating.   Endocrine: Negative for hot flashes.   Genitourinary:  Negative for hot flashes, pelvic pain and vaginal bleeding.   Psychiatric/Behavioral:  Negative for sleep disturbance.         OBJECTIVE:     Physical Exam:  Physical Exam  Vitals reviewed.   Constitutional:       General: She is not in acute distress.     Appearance: She is well-developed. She is not diaphoretic.   HENT:      Head: Normocephalic and atraumatic.      Nose: Nose normal.   Eyes:      Extraocular Movements: Extraocular movements intact.      Conjunctiva/sclera: Conjunctivae normal.   Cardiovascular:      Rate and Rhythm: Normal rate.   Pulmonary:      Effort: Pulmonary effort is normal. No respiratory distress.   Abdominal:      Palpations: Abdomen is soft.      Tenderness: There is no abdominal tenderness.   Musculoskeletal:         General: No tenderness. Normal range of motion.      Cervical back: Normal range of motion.   Skin:     General: Skin is warm and dry.   Neurological:      Mental Status: She is alert and oriented to person,  place, and time.   Psychiatric:         Behavior: Behavior normal.         Thought Content: Thought content normal.         Judgment: Judgment normal.           ASSESSMENT:       ICD-10-CM ICD-9-CM    1. Weight gain  R63.5 783.1 FOLLICLE STIMULATING HORMONE      ESTRADIOL      LUTEINIZING HORMONE      TESTOSTERONE             Plan:      -- Discussed that her reproductive hormones will likely reflect normal postmenopausal levels. Unlikely to be clinically helpful. She would like them checked anyway.  -- Recommend a well visit for a breast and pelvic exam. Explained importance of pap testing if cervix is still present and lack of screening tests for other types of gynecologic cancers. She states she will schedule this a later date.      Moon Garcia MD

## 2024-06-04 ENCOUNTER — PATIENT MESSAGE (OUTPATIENT)
Dept: OPTOMETRY | Facility: CLINIC | Age: 69
End: 2024-06-04
Payer: MEDICARE

## 2024-06-04 ENCOUNTER — TELEPHONE (OUTPATIENT)
Dept: OPTOMETRY | Facility: CLINIC | Age: 69
End: 2024-06-04
Payer: MEDICARE

## 2024-06-04 NOTE — TELEPHONE ENCOUNTER
Final  Contact Lens Final Rx       Final Contact Lens Rx         Brand Base Curve Diameter Sphere Cylinder Addl. Specs    Right Air Optix Plus HydraGlyde 8.6 14.2 +0.50 Sphere Colors    Left Air Optix Plus HydraGlyde 8.6 14.2 -1.75 Sphere Colors

## 2024-06-23 ENCOUNTER — PATIENT MESSAGE (OUTPATIENT)
Dept: OPTOMETRY | Facility: CLINIC | Age: 69
End: 2024-06-23
Payer: MEDICARE

## 2024-08-05 ENCOUNTER — HOSPITAL ENCOUNTER (OUTPATIENT)
Facility: OTHER | Age: 69
Discharge: HOME | End: 2024-08-06
Attending: EMERGENCY MEDICINE | Admitting: EMERGENCY MEDICINE
Payer: MEDICARE

## 2024-08-05 DIAGNOSIS — R07.9 CHEST PAIN: Primary | ICD-10-CM

## 2024-08-05 DIAGNOSIS — M54.12 CERVICAL RADICULOPATHY: ICD-10-CM

## 2024-08-05 DIAGNOSIS — R00.2 PALPITATIONS: ICD-10-CM

## 2024-08-05 LAB
ALBUMIN SERPL BCP-MCNC: 4.1 G/DL (ref 3.5–5.2)
ALP SERPL-CCNC: 69 U/L (ref 55–135)
ALT SERPL W/O P-5'-P-CCNC: 14 U/L (ref 10–44)
ANION GAP SERPL CALC-SCNC: 12 MMOL/L (ref 8–16)
AST SERPL-CCNC: 16 U/L (ref 10–40)
BACTERIA #/AREA URNS HPF: ABNORMAL /HPF
BASOPHILS # BLD AUTO: 0.02 K/UL (ref 0–0.2)
BASOPHILS NFR BLD: 0.2 % (ref 0–1.9)
BILIRUB SERPL-MCNC: 0.7 MG/DL (ref 0.1–1)
BILIRUB UR QL STRIP: NEGATIVE
BNP SERPL-MCNC: <10 PG/ML (ref 0–99)
BUN SERPL-MCNC: 14 MG/DL (ref 8–23)
CALCIUM SERPL-MCNC: 10 MG/DL (ref 8.7–10.5)
CHLORIDE SERPL-SCNC: 106 MMOL/L (ref 95–110)
CLARITY UR: CLEAR
CO2 SERPL-SCNC: 20 MMOL/L (ref 23–29)
COLOR UR: YELLOW
CREAT SERPL-MCNC: 0.9 MG/DL (ref 0.5–1.4)
D DIMER PPP IA.FEU-MCNC: 0.28 MG/L FEU
DIFFERENTIAL METHOD BLD: NORMAL
EOSINOPHIL # BLD AUTO: 0.1 K/UL (ref 0–0.5)
EOSINOPHIL NFR BLD: 0.7 % (ref 0–8)
ERYTHROCYTE [DISTWIDTH] IN BLOOD BY AUTOMATED COUNT: 13.3 % (ref 11.5–14.5)
EST. GFR  (NO RACE VARIABLE): >60 ML/MIN/1.73 M^2
GLUCOSE SERPL-MCNC: 92 MG/DL (ref 70–110)
GLUCOSE UR QL STRIP: NEGATIVE
HCT VFR BLD AUTO: 38.5 % (ref 37–48.5)
HGB BLD-MCNC: 12.7 G/DL (ref 12–16)
HGB UR QL STRIP: ABNORMAL
IMM GRANULOCYTES # BLD AUTO: 0.01 K/UL (ref 0–0.04)
IMM GRANULOCYTES NFR BLD AUTO: 0.1 % (ref 0–0.5)
KETONES UR QL STRIP: NEGATIVE
LEUKOCYTE ESTERASE UR QL STRIP: ABNORMAL
LYMPHOCYTES # BLD AUTO: 2.9 K/UL (ref 1–4.8)
LYMPHOCYTES NFR BLD: 35.9 % (ref 18–48)
MAGNESIUM SERPL-MCNC: 2 MG/DL (ref 1.6–2.6)
MCH RBC QN AUTO: 29.7 PG (ref 27–31)
MCHC RBC AUTO-ENTMCNC: 33 G/DL (ref 32–36)
MCV RBC AUTO: 90 FL (ref 82–98)
MICROSCOPIC COMMENT: ABNORMAL
MONOCYTES # BLD AUTO: 0.8 K/UL (ref 0.3–1)
MONOCYTES NFR BLD: 9.2 % (ref 4–15)
NEUTROPHILS # BLD AUTO: 4.4 K/UL (ref 1.8–7.7)
NEUTROPHILS NFR BLD: 53.9 % (ref 38–73)
NITRITE UR QL STRIP: NEGATIVE
NRBC BLD-RTO: 0 /100 WBC
PH UR STRIP: 5 [PH] (ref 5–8)
PHOSPHATE SERPL-MCNC: 3.2 MG/DL (ref 2.7–4.5)
PLATELET # BLD AUTO: 335 K/UL (ref 150–450)
PMV BLD AUTO: 9.3 FL (ref 9.2–12.9)
POTASSIUM SERPL-SCNC: 3.6 MMOL/L (ref 3.5–5.1)
PROT SERPL-MCNC: 7.8 G/DL (ref 6–8.4)
PROT UR QL STRIP: NEGATIVE
RBC # BLD AUTO: 4.27 M/UL (ref 4–5.4)
RBC #/AREA URNS HPF: 2 /HPF (ref 0–4)
SODIUM SERPL-SCNC: 138 MMOL/L (ref 136–145)
SP GR UR STRIP: 1.01 (ref 1–1.03)
SQUAMOUS #/AREA URNS HPF: 0 /HPF
TROPONIN I SERPL DL<=0.01 NG/ML-MCNC: <0.006 NG/ML (ref 0–0.03)
URN SPEC COLLECT METH UR: ABNORMAL
UROBILINOGEN UR STRIP-ACNC: NEGATIVE EU/DL
WBC # BLD AUTO: 8.18 K/UL (ref 3.9–12.7)
WBC #/AREA URNS HPF: 23 /HPF (ref 0–5)

## 2024-08-05 PROCEDURE — 25000003 PHARM REV CODE 250: Mod: HCWC | Performed by: PHYSICIAN ASSISTANT

## 2024-08-05 PROCEDURE — 83880 ASSAY OF NATRIURETIC PEPTIDE: CPT | Mod: HCWC | Performed by: PHYSICIAN ASSISTANT

## 2024-08-05 PROCEDURE — 85025 COMPLETE CBC W/AUTO DIFF WBC: CPT | Mod: HCWC | Performed by: PHYSICIAN ASSISTANT

## 2024-08-05 PROCEDURE — 85379 FIBRIN DEGRADATION QUANT: CPT | Mod: HCWC | Performed by: PHYSICIAN ASSISTANT

## 2024-08-05 PROCEDURE — 93005 ELECTROCARDIOGRAM TRACING: CPT | Mod: HCWC

## 2024-08-05 PROCEDURE — G0378 HOSPITAL OBSERVATION PER HR: HCPCS | Mod: HCWC

## 2024-08-05 PROCEDURE — 84484 ASSAY OF TROPONIN QUANT: CPT | Mod: 91,HCWC | Performed by: PHYSICIAN ASSISTANT

## 2024-08-05 PROCEDURE — 81000 URINALYSIS NONAUTO W/SCOPE: CPT | Mod: HCWC | Performed by: EMERGENCY MEDICINE

## 2024-08-05 PROCEDURE — 87086 URINE CULTURE/COLONY COUNT: CPT | Mod: HCWC | Performed by: EMERGENCY MEDICINE

## 2024-08-05 PROCEDURE — 84484 ASSAY OF TROPONIN QUANT: CPT | Mod: HCWC | Performed by: PHYSICIAN ASSISTANT

## 2024-08-05 PROCEDURE — 84100 ASSAY OF PHOSPHORUS: CPT | Mod: HCWC | Performed by: PHYSICIAN ASSISTANT

## 2024-08-05 PROCEDURE — 80053 COMPREHEN METABOLIC PANEL: CPT | Mod: HCWC | Performed by: PHYSICIAN ASSISTANT

## 2024-08-05 PROCEDURE — 93010 ELECTROCARDIOGRAM REPORT: CPT | Mod: HCWC,,, | Performed by: INTERNAL MEDICINE

## 2024-08-05 PROCEDURE — 83735 ASSAY OF MAGNESIUM: CPT | Mod: HCWC | Performed by: PHYSICIAN ASSISTANT

## 2024-08-05 RX ORDER — LOSARTAN POTASSIUM 50 MG/1
100 TABLET ORAL DAILY
Status: DISCONTINUED | OUTPATIENT
Start: 2024-08-06 | End: 2024-08-06 | Stop reason: HOSPADM

## 2024-08-05 RX ORDER — NITROGLYCERIN 0.4 MG/1
0.4 TABLET SUBLINGUAL EVERY 5 MIN PRN
Status: DISCONTINUED | OUTPATIENT
Start: 2024-08-05 | End: 2024-08-06 | Stop reason: HOSPADM

## 2024-08-05 RX ORDER — HYDROCHLOROTHIAZIDE 25 MG/1
25 TABLET ORAL
Status: COMPLETED | OUTPATIENT
Start: 2024-08-05 | End: 2024-08-05

## 2024-08-05 RX ORDER — ACETAMINOPHEN 325 MG/1
650 TABLET ORAL EVERY 8 HOURS PRN
Status: DISCONTINUED | OUTPATIENT
Start: 2024-08-05 | End: 2024-08-06 | Stop reason: HOSPADM

## 2024-08-05 RX ORDER — TALC
6 POWDER (GRAM) TOPICAL NIGHTLY PRN
Status: DISCONTINUED | OUTPATIENT
Start: 2024-08-05 | End: 2024-08-06 | Stop reason: HOSPADM

## 2024-08-05 RX ORDER — SODIUM CHLORIDE 0.9 % (FLUSH) 0.9 %
10 SYRINGE (ML) INJECTION
Status: DISCONTINUED | OUTPATIENT
Start: 2024-08-05 | End: 2024-08-06 | Stop reason: HOSPADM

## 2024-08-05 RX ORDER — ONDANSETRON 8 MG/1
8 TABLET, ORALLY DISINTEGRATING ORAL EVERY 8 HOURS PRN
Status: DISCONTINUED | OUTPATIENT
Start: 2024-08-05 | End: 2024-08-06 | Stop reason: HOSPADM

## 2024-08-05 RX ORDER — ATORVASTATIN CALCIUM 10 MG/1
10 TABLET, FILM COATED ORAL DAILY
Status: DISCONTINUED | OUTPATIENT
Start: 2024-08-06 | End: 2024-08-06 | Stop reason: HOSPADM

## 2024-08-05 RX ADMIN — HYDROCHLOROTHIAZIDE 25 MG: 25 TABLET ORAL at 11:08

## 2024-08-06 VITALS
WEIGHT: 210 LBS | OXYGEN SATURATION: 93 % | SYSTOLIC BLOOD PRESSURE: 144 MMHG | RESPIRATION RATE: 18 BRPM | TEMPERATURE: 98 F | HEIGHT: 65 IN | HEART RATE: 75 BPM | BODY MASS INDEX: 34.99 KG/M2 | DIASTOLIC BLOOD PRESSURE: 79 MMHG

## 2024-08-06 LAB
ALBUMIN SERPL BCP-MCNC: 3.7 G/DL (ref 3.5–5.2)
ALP SERPL-CCNC: 62 U/L (ref 55–135)
ALT SERPL W/O P-5'-P-CCNC: 11 U/L (ref 10–44)
ANION GAP SERPL CALC-SCNC: 13 MMOL/L (ref 8–16)
AST SERPL-CCNC: 16 U/L (ref 10–40)
BASOPHILS # BLD AUTO: 0.02 K/UL (ref 0–0.2)
BASOPHILS NFR BLD: 0.3 % (ref 0–1.9)
BILIRUB SERPL-MCNC: 0.9 MG/DL (ref 0.1–1)
BUN SERPL-MCNC: 12 MG/DL (ref 8–23)
CALCIUM SERPL-MCNC: 9.7 MG/DL (ref 8.7–10.5)
CHLORIDE SERPL-SCNC: 107 MMOL/L (ref 95–110)
CO2 SERPL-SCNC: 19 MMOL/L (ref 23–29)
CREAT SERPL-MCNC: 0.8 MG/DL (ref 0.5–1.4)
DIFFERENTIAL METHOD BLD: ABNORMAL
EOSINOPHIL # BLD AUTO: 0.1 K/UL (ref 0–0.5)
EOSINOPHIL NFR BLD: 1 % (ref 0–8)
ERYTHROCYTE [DISTWIDTH] IN BLOOD BY AUTOMATED COUNT: 13.2 % (ref 11.5–14.5)
EST. GFR  (NO RACE VARIABLE): >60 ML/MIN/1.73 M^2
GLUCOSE SERPL-MCNC: 102 MG/DL (ref 70–110)
HCT VFR BLD AUTO: 38 % (ref 37–48.5)
HGB BLD-MCNC: 12.1 G/DL (ref 12–16)
IMM GRANULOCYTES # BLD AUTO: 0.01 K/UL (ref 0–0.04)
IMM GRANULOCYTES NFR BLD AUTO: 0.1 % (ref 0–0.5)
LYMPHOCYTES # BLD AUTO: 2.7 K/UL (ref 1–4.8)
LYMPHOCYTES NFR BLD: 37.2 % (ref 18–48)
MCH RBC QN AUTO: 29.4 PG (ref 27–31)
MCHC RBC AUTO-ENTMCNC: 31.8 G/DL (ref 32–36)
MCV RBC AUTO: 92 FL (ref 82–98)
MONOCYTES # BLD AUTO: 0.8 K/UL (ref 0.3–1)
MONOCYTES NFR BLD: 11 % (ref 4–15)
NEUTROPHILS # BLD AUTO: 3.7 K/UL (ref 1.8–7.7)
NEUTROPHILS NFR BLD: 50.4 % (ref 38–73)
NRBC BLD-RTO: 0 /100 WBC
OHS QRS DURATION: 76 MS
OHS QTC CALCULATION: 431 MS
PLATELET # BLD AUTO: 291 K/UL (ref 150–450)
PMV BLD AUTO: 9.5 FL (ref 9.2–12.9)
POTASSIUM SERPL-SCNC: 3.5 MMOL/L (ref 3.5–5.1)
PROT SERPL-MCNC: 7.3 G/DL (ref 6–8.4)
RBC # BLD AUTO: 4.12 M/UL (ref 4–5.4)
SODIUM SERPL-SCNC: 139 MMOL/L (ref 136–145)
TROPONIN I SERPL DL<=0.01 NG/ML-MCNC: <0.006 NG/ML (ref 0–0.03)
TROPONIN I SERPL DL<=0.01 NG/ML-MCNC: <0.006 NG/ML (ref 0–0.03)
WBC # BLD AUTO: 7.24 K/UL (ref 3.9–12.7)

## 2024-08-06 PROCEDURE — 80053 COMPREHEN METABOLIC PANEL: CPT | Mod: HCWC | Performed by: PHYSICIAN ASSISTANT

## 2024-08-06 PROCEDURE — 84484 ASSAY OF TROPONIN QUANT: CPT | Mod: HCWC | Performed by: PHYSICIAN ASSISTANT

## 2024-08-06 PROCEDURE — 63600175 PHARM REV CODE 636 W HCPCS: Mod: HCWC | Performed by: NURSE PRACTITIONER

## 2024-08-06 PROCEDURE — 85025 COMPLETE CBC W/AUTO DIFF WBC: CPT | Mod: HCWC | Performed by: PHYSICIAN ASSISTANT

## 2024-08-06 PROCEDURE — 25000003 PHARM REV CODE 250: Mod: HCWC | Performed by: NURSE PRACTITIONER

## 2024-08-06 PROCEDURE — G0378 HOSPITAL OBSERVATION PER HR: HCPCS | Mod: HCWC

## 2024-08-06 PROCEDURE — 25000003 PHARM REV CODE 250: Mod: HCWC | Performed by: PHYSICIAN ASSISTANT

## 2024-08-06 RX ORDER — ORPHENADRINE CITRATE 100 MG/1
100 TABLET, EXTENDED RELEASE ORAL
Status: COMPLETED | OUTPATIENT
Start: 2024-08-06 | End: 2024-08-06

## 2024-08-06 RX ORDER — METHOCARBAMOL 500 MG/1
1000 TABLET, FILM COATED ORAL 3 TIMES DAILY
Qty: 30 TABLET | Refills: 0 | Status: SHIPPED | OUTPATIENT
Start: 2024-08-06 | End: 2024-08-11

## 2024-08-06 RX ORDER — METHYLPREDNISOLONE 4 MG/1
TABLET ORAL
Qty: 21 EACH | Refills: 0 | Status: SHIPPED | OUTPATIENT
Start: 2024-08-06

## 2024-08-06 RX ORDER — LIDOCAINE 50 MG/G
1 PATCH TOPICAL
Status: DISCONTINUED | OUTPATIENT
Start: 2024-08-06 | End: 2024-08-06 | Stop reason: HOSPADM

## 2024-08-06 RX ORDER — HYDROCODONE BITARTRATE AND ACETAMINOPHEN 5; 325 MG/1; MG/1
1 TABLET ORAL
Status: COMPLETED | OUTPATIENT
Start: 2024-08-06 | End: 2024-08-06

## 2024-08-06 RX ORDER — LIDOCAINE 50 MG/G
1 PATCH TOPICAL DAILY
Qty: 15 PATCH | Refills: 0 | Status: SHIPPED | OUTPATIENT
Start: 2024-08-06

## 2024-08-06 RX ORDER — NAPROXEN 375 MG/1
375 TABLET ORAL 2 TIMES DAILY WITH MEALS
Qty: 60 TABLET | Refills: 0 | Status: SHIPPED | OUTPATIENT
Start: 2024-08-06

## 2024-08-06 RX ORDER — KETOROLAC TROMETHAMINE 30 MG/ML
15 INJECTION, SOLUTION INTRAMUSCULAR; INTRAVENOUS
Status: COMPLETED | OUTPATIENT
Start: 2024-08-06 | End: 2024-08-06

## 2024-08-06 RX ADMIN — LOSARTAN POTASSIUM 100 MG: 50 TABLET, FILM COATED ORAL at 09:08

## 2024-08-06 RX ADMIN — KETOROLAC TROMETHAMINE 15 MG: 30 INJECTION, SOLUTION INTRAMUSCULAR; INTRAVENOUS at 12:08

## 2024-08-06 RX ADMIN — ORPHENADRINE CITRATE 100 MG: 100 TABLET, EXTENDED RELEASE ORAL at 12:08

## 2024-08-06 RX ADMIN — LIDOCAINE 1 PATCH: 50 PATCH CUTANEOUS at 12:08

## 2024-08-06 RX ADMIN — HYDROCODONE BITARTRATE AND ACETAMINOPHEN 1 TABLET: 5; 325 TABLET ORAL at 05:08

## 2024-08-06 RX ADMIN — ATORVASTATIN CALCIUM 10 MG: 10 TABLET, FILM COATED ORAL at 09:08

## 2024-08-07 LAB
BACTERIA UR CULT: NO GROWTH
OHS QRS DURATION: 76 MS
OHS QTC CALCULATION: 427 MS

## 2024-08-08 ENCOUNTER — PATIENT OUTREACH (OUTPATIENT)
Dept: ADMINISTRATIVE | Facility: CLINIC | Age: 69
End: 2024-08-08
Payer: MEDICARE

## 2024-10-03 ENCOUNTER — OFFICE VISIT (OUTPATIENT)
Dept: CARDIOLOGY | Facility: CLINIC | Age: 69
End: 2024-10-03
Payer: MEDICARE

## 2024-10-03 VITALS
OXYGEN SATURATION: 98 % | SYSTOLIC BLOOD PRESSURE: 148 MMHG | WEIGHT: 223.38 LBS | DIASTOLIC BLOOD PRESSURE: 74 MMHG | HEART RATE: 83 BPM | BODY MASS INDEX: 37.18 KG/M2

## 2024-10-03 DIAGNOSIS — Z82.49 FAMILY HISTORY OF EARLY CAD: ICD-10-CM

## 2024-10-03 DIAGNOSIS — Z86.79 HISTORY OF RHEUMATIC FEVER AS A CHILD: ICD-10-CM

## 2024-10-03 DIAGNOSIS — R07.89 OTHER CHEST PAIN: ICD-10-CM

## 2024-10-03 DIAGNOSIS — I10 PRIMARY HYPERTENSION: ICD-10-CM

## 2024-10-03 DIAGNOSIS — R07.9 CHEST PAIN: ICD-10-CM

## 2024-10-03 DIAGNOSIS — I70.0 AORTIC ATHEROSCLEROSIS: Primary | ICD-10-CM

## 2024-10-03 PROCEDURE — 1101F PT FALLS ASSESS-DOCD LE1/YR: CPT | Mod: CPTII,S$GLB,, | Performed by: INTERNAL MEDICINE

## 2024-10-03 PROCEDURE — 3066F NEPHROPATHY DOC TX: CPT | Mod: CPTII,S$GLB,, | Performed by: INTERNAL MEDICINE

## 2024-10-03 PROCEDURE — 99999 PR PBB SHADOW E&M-EST. PATIENT-LVL III: CPT | Mod: PBBFAC,HCWC,, | Performed by: INTERNAL MEDICINE

## 2024-10-03 PROCEDURE — 1159F MED LIST DOCD IN RCRD: CPT | Mod: CPTII,S$GLB,, | Performed by: INTERNAL MEDICINE

## 2024-10-03 PROCEDURE — 1126F AMNT PAIN NOTED NONE PRSNT: CPT | Mod: CPTII,S$GLB,, | Performed by: INTERNAL MEDICINE

## 2024-10-03 PROCEDURE — 3078F DIAST BP <80 MM HG: CPT | Mod: CPTII,S$GLB,, | Performed by: INTERNAL MEDICINE

## 2024-10-03 PROCEDURE — 3008F BODY MASS INDEX DOCD: CPT | Mod: CPTII,S$GLB,, | Performed by: INTERNAL MEDICINE

## 2024-10-03 PROCEDURE — 3077F SYST BP >= 140 MM HG: CPT | Mod: CPTII,S$GLB,, | Performed by: INTERNAL MEDICINE

## 2024-10-03 PROCEDURE — 99204 OFFICE O/P NEW MOD 45 MIN: CPT | Mod: S$GLB,,, | Performed by: INTERNAL MEDICINE

## 2024-10-03 PROCEDURE — 3288F FALL RISK ASSESSMENT DOCD: CPT | Mod: CPTII,S$GLB,, | Performed by: INTERNAL MEDICINE

## 2024-10-03 NOTE — PROGRESS NOTES
Cardiology    10/3/2024  2:54 PM    Problem list  Patient Active Problem List   Diagnosis    BMI 33.0-33.9,adult    Hypertension    Situational stress    Joint pain    Severe obesity (BMI 35.0-39.9) with comorbidity    Aortic atherosclerosis    Other chest pain    Family history of early CAD    History of rheumatic fever as a child       CC:  Chest pain    HPI:  Patient is here for ER follow-up when she presented in August with episode of chest pain.  She describes chest pain was a pulling sensation over her left breast which occurred while she was sitting at her desk.  She went to emergency room and was admitted for observation.  She was seen by Dr Gomez who recommended stress echocardiogram.  Stress echocardiogram was ordered but it was never scheduled.  Since then, she has not had any recurrence of her chest pain.  She is active.  Her PCP's Dr. Page.  He recently stopped taking atorvastatin and she would have her labs rechecked in 3 months.  She wants to take a holistic approach to care.  She does not smoke or drink.  She denies any fever or chills.    Medications  Current Outpatient Medications   Medication Sig Dispense Refill    losartan-hydrochlorothiazide 100-25 mg (HYZAAR) 100-25 mg per tablet Take 1 tablet by mouth.      LIDOcaine (LIDODERM) 5 % Place 1 patch onto the skin once daily. Remove & Discard patch within 12 hours or as directed by MD (Patient not taking: Reported on 10/3/2024) 15 patch 0    methylPREDNISolone (MEDROL DOSEPACK) 4 mg tablet use as directed (Patient not taking: Reported on 10/3/2024) 21 each 0    naproxen (NAPROSYN) 375 MG tablet Take 1 tablet (375 mg total) by mouth 2 (two) times daily with meals. (Patient not taking: Reported on 10/3/2024) 60 tablet 0     No current facility-administered medications for this visit.      Prior to Admission medications    Medication Sig Start Date End Date Taking? Authorizing Provider   losartan-hydrochlorothiazide 100-25 mg (HYZAAR)  100-25 mg per tablet Take 1 tablet by mouth. 11/13/23  Yes Provider, Historical   LIDOcaine (LIDODERM) 5 % Place 1 patch onto the skin once daily. Remove & Discard patch within 12 hours or as directed by MD  Patient not taking: Reported on 10/3/2024 8/6/24   Anabella Ricks FNP   methylPREDNISolone (MEDROL DOSEPACK) 4 mg tablet use as directed  Patient not taking: Reported on 10/3/2024 8/6/24   Anabella Ricks FNP   naproxen (NAPROSYN) 375 MG tablet Take 1 tablet (375 mg total) by mouth 2 (two) times daily with meals.  Patient not taking: Reported on 10/3/2024 8/6/24   Anabella Ricks FNP   amLODIPine (NORVASC) 10 MG tablet Take 10 mg by mouth once daily.  10/3/24  Provider, Historical   atorvastatin (LIPITOR) 10 MG tablet Take 10 mg by mouth. 11/13/23 10/3/24  Provider, Historical   triamcinolone acetonide 0.1% (KENALOG) 0.1 % cream Apply topically 2 (two) times daily. 2/29/24 10/3/24  Provider, Historical         History  Past Medical History:   Diagnosis Date    Hypertension     Urticaria      Past Surgical History:   Procedure Laterality Date    ADENOIDECTOMY      HYSTERECTOMY  01/01/1996    TONSILLECTOMY       Social History     Socioeconomic History    Marital status: Single   Occupational History     Employer: OTHER   Tobacco Use    Smoking status: Former     Types: Cigarettes    Smokeless tobacco: Never    Tobacco comments:     quit 27 years ago   Substance and Sexual Activity    Alcohol use: Not Currently     Alcohol/week: 1.0 standard drink of alcohol     Types: 1 Cans of beer per week     Comment: once a month     Drug use: No    Sexual activity: Not Currently     Social Drivers of Health     Financial Resource Strain: Medium Risk (3/8/2024)    Overall Financial Resource Strain (CARDIA)     Difficulty of Paying Living Expenses: Somewhat hard   Food Insecurity: No Food Insecurity (3/8/2024)    Hunger Vital Sign     Worried About Running Out of Food in the Last Year: Never true     Ran Out of Food in the  Last Year: Never true   Transportation Needs: No Transportation Needs (3/8/2024)    PRAPARE - Transportation     Lack of Transportation (Medical): No     Lack of Transportation (Non-Medical): No   Physical Activity: Inactive (3/8/2024)    Exercise Vital Sign     Days of Exercise per Week: 0 days     Minutes of Exercise per Session: 0 min   Stress: No Stress Concern Present (3/8/2024)    [employer] [place] [employer] - Occupational Stress Questionnaire     Feeling of Stress : Only a little   Housing Stability: Unknown (3/8/2024)    Housing Stability Vital Sign     Unable to Pay for Housing in the Last Year: Patient declined     Number of Places Lived in the Last Year: 1     Unstable Housing in the Last Year: No         Allergies  Review of patient's allergies indicates:   Allergen Reactions    Morphine Hives and Rash    Sulfa (sulfonamide antibiotics) Hives and Rash    Codeine Hives and Rash         Review of Systems   Review of Systems   Constitutional: Negative for decreased appetite, fever and weight loss.   HENT:  Negative for congestion and nosebleeds.    Eyes:  Negative for double vision, vision loss in left eye, vision loss in right eye and visual disturbance.   Cardiovascular:  Positive for chest pain. Negative for claudication, cyanosis, dyspnea on exertion, irregular heartbeat, leg swelling, near-syncope, orthopnea, palpitations, paroxysmal nocturnal dyspnea and syncope.   Respiratory:  Negative for cough, hemoptysis, shortness of breath, sleep disturbances due to breathing, snoring, sputum production and wheezing.    Endocrine: Negative for cold intolerance and heat intolerance.   Skin:  Negative for nail changes and rash.   Musculoskeletal:  Negative for joint pain, muscle cramps, muscle weakness and myalgias.   Gastrointestinal:  Negative for change in bowel habit, heartburn, hematemesis, hematochezia, hemorrhoids and melena.   Neurological:  Negative for dizziness, focal weakness and  headaches.         Physical Exam  Wt Readings from Last 1 Encounters:   10/03/24 101.3 kg (223 lb 6.4 oz)     BP Readings from Last 3 Encounters:   10/03/24 (!) 148/74   08/06/24 (!) 144/79   04/30/24 118/78     Pulse Readings from Last 1 Encounters:   10/03/24 83     Body mass index is 37.18 kg/m².    Physical Exam  Constitutional:       Appearance: She is well-developed.   HENT:      Head: Atraumatic.   Eyes:      General: No scleral icterus.  Neck:      Vascular: Normal carotid pulses. No carotid bruit, hepatojugular reflux or JVD.   Cardiovascular:      Rate and Rhythm: Normal rate and regular rhythm.      Chest Wall: PMI is not displaced.      Pulses: Intact distal pulses.           Carotid pulses are 2+ on the right side and 2+ on the left side.       Radial pulses are 2+ on the right side and 2+ on the left side.        Dorsalis pedis pulses are 2+ on the right side and 2+ on the left side.      Heart sounds: Normal heart sounds, S1 normal and S2 normal. No murmur heard.     No friction rub.   Pulmonary:      Effort: Pulmonary effort is normal. No respiratory distress.      Breath sounds: Normal breath sounds. No stridor. No wheezing or rales.   Chest:      Chest wall: No tenderness.   Abdominal:      General: Bowel sounds are normal.      Palpations: Abdomen is soft.   Musculoskeletal:      Cervical back: Neck supple. No edema.   Skin:     General: Skin is warm and dry.      Nails: There is no clubbing.   Neurological:      Mental Status: She is alert and oriented to person, place, and time.   Psychiatric:         Behavior: Behavior normal.         Thought Content: Thought content normal.             Assessment  1. Chest pain  Atypical.  - Ambulatory referral/consult to Cardiology    2. Aortic atherosclerosis (Primary)  Unchanged    3. Primary hypertension  Controlled.  Continue current medications and monitor    4. History of rheumatic fever as a child  unchanged  - Echo; Future    5. Family history of  early CAD  unchanged        Plan and Discussion  Discussed her emergency room visit in August for atypical chest pain.  Her troponin was negative.  She had no acute EKG change.  Will proceed with a stress echocardiogram to assess her chest pain given her family history for premature coronary disease.  Given her history of rheumatic fever as a child, will get an echocardiogram to assess any valvular heart disease.  Defer lipid management to her PCP.    Follow Up  1 month      Catalino Terry MD, F.A.C.C, F.S.C.A.I.      Total professional time spent for the encounter: 40 minutes  Time was spent preparing to see the patient, reviewing results of prior testing, obtaining and/or reviewing separately obtained history, performing a medically appropriate examination and interview, counseling and educating the patient/family, ordering medications/tests/procedures, referring and communicating with other health care professionals, documenting clinical information in the electronic health record, and independently interpreting results.    Disclaimer: This document was created using voice recognition software (M*Modal Fluency Direct). Although it may be edited, this document may contain errors related to incorrect recognition of the spoken word. Please call the physician if clarification is needed.

## 2024-10-15 ENCOUNTER — HOSPITAL ENCOUNTER (OUTPATIENT)
Dept: CARDIOLOGY | Facility: OTHER | Age: 69
Discharge: HOME | End: 2024-10-15
Attending: INTERNAL MEDICINE
Payer: MEDICARE

## 2024-10-15 ENCOUNTER — HOSPITAL ENCOUNTER (OUTPATIENT)
Dept: CARDIOLOGY | Facility: OTHER | Age: 69
Discharge: HOME | End: 2024-10-15
Attending: NURSE PRACTITIONER
Payer: MEDICARE

## 2024-10-15 VITALS
BODY MASS INDEX: 37.15 KG/M2 | HEIGHT: 65 IN | SYSTOLIC BLOOD PRESSURE: 114 MMHG | BODY MASS INDEX: 37.15 KG/M2 | DIASTOLIC BLOOD PRESSURE: 64 MMHG | HEART RATE: 88 BPM | DIASTOLIC BLOOD PRESSURE: 64 MMHG | HEART RATE: 88 BPM | WEIGHT: 223 LBS | WEIGHT: 223 LBS | SYSTOLIC BLOOD PRESSURE: 114 MMHG | HEIGHT: 65 IN

## 2024-10-15 DIAGNOSIS — R07.9 CHEST PAIN: ICD-10-CM

## 2024-10-15 DIAGNOSIS — Z86.79 HISTORY OF RHEUMATIC FEVER AS A CHILD: ICD-10-CM

## 2024-10-15 LAB
ASCENDING AORTA: 3.4 CM
AV INDEX (PROSTH): 0.78
AV MEAN GRADIENT: 9.9 MMHG
AV PEAK GRADIENT: 16 MMHG
AV VALVE AREA BY VELOCITY RATIO: 2.8 CM²
AV VALVE AREA: 2.7 CM²
AV VELOCITY RATIO: 0.8
BSA FOR ECHO PROCEDURE: 2.15 M2
BSA FOR ECHO PROCEDURE: 2.15 M2
CV ECHO LV RWT: 0.39 CM
CV ECHO LV RWT: 0.63 CM
CV STRESS BASE HR: 88 BPM
DIASTOLIC BLOOD PRESSURE: 64 MMHG
DOP CALC AO PEAK VEL: 2 M/S
DOP CALC AO VTI: 34.1 CM
DOP CALC LVOT AREA: 3.5 CM2
DOP CALC LVOT DIAMETER: 2.1 CM
DOP CALC LVOT PEAK VEL: 1.6 M/S
DOP CALC LVOT STROKE VOLUME: 92.1 CM3
DOP CALC RVOT PEAK VEL: 0.66 M/S
DOP CALCLVOT PEAK VEL VTI: 26.6 CM
E WAVE DECELERATION TIME: 203.71 MSEC
E/A RATIO: 0.43
E/E' RATIO: 6.4 M/S
ECHO LV POSTERIOR WALL: 0.7 CM (ref 0.6–1.1)
ECHO LV POSTERIOR WALL: 1 CM (ref 0.6–1.1)
FRACTIONAL SHORTENING: 34.4 % (ref 28–44)
FRACTIONAL SHORTENING: 55.6 % (ref 28–44)
INTERVENTRICULAR SEPTUM: 0.7 CM (ref 0.6–1.1)
INTERVENTRICULAR SEPTUM: 1.2 CM (ref 0.6–1.1)
IVC DIAMETER: 1.25 CM
LA MAJOR: 5.36 CM
LA MINOR: 5.28 CM
LA WIDTH: 3.5 CM
LEFT ATRIUM AREA SYSTOLIC (APICAL 2 CHAMBER): 18.53 CM2
LEFT ATRIUM AREA SYSTOLIC (APICAL 4 CHAMBER): 18.43 CM2
LEFT ATRIUM SIZE: 3.99 CM
LEFT ATRIUM VOLUME INDEX MOD: 24.7 ML/M2
LEFT ATRIUM VOLUME INDEX: 30.5 ML/M2
LEFT ATRIUM VOLUME MOD: 51.22 ML
LEFT ATRIUM VOLUME: 63.15 CM3
LEFT INTERNAL DIMENSION IN SYSTOLE: 1.6 CM (ref 2.1–4)
LEFT INTERNAL DIMENSION IN SYSTOLE: 2.1 CM (ref 2.1–4)
LEFT VENTRICLE DIASTOLIC VOLUME INDEX: 20.36 ML/M2
LEFT VENTRICLE DIASTOLIC VOLUME INDEX: 25.63 ML/M2
LEFT VENTRICLE DIASTOLIC VOLUME: 42.14 ML
LEFT VENTRICLE DIASTOLIC VOLUME: 53.06 ML
LEFT VENTRICLE END SYSTOLIC VOLUME APICAL 2 CHAMBER: 51.52 ML
LEFT VENTRICLE END SYSTOLIC VOLUME APICAL 4 CHAMBER: 50.02 ML
LEFT VENTRICLE MASS INDEX: 31.8 G/M2
LEFT VENTRICLE MASS INDEX: 50.4 G/M2
LEFT VENTRICLE SYSTOLIC VOLUME INDEX: 3.4 ML/M2
LEFT VENTRICLE SYSTOLIC VOLUME INDEX: 6.8 ML/M2
LEFT VENTRICLE SYSTOLIC VOLUME: 14.09 ML
LEFT VENTRICLE SYSTOLIC VOLUME: 7.1 ML
LEFT VENTRICULAR INTERNAL DIMENSION IN DIASTOLE: 3.2 CM (ref 3.5–6)
LEFT VENTRICULAR INTERNAL DIMENSION IN DIASTOLE: 3.6 CM (ref 3.5–6)
LEFT VENTRICULAR MASS: 104.3 G
LEFT VENTRICULAR MASS: 65.8 G
LV LATERAL E/E' RATIO: 5.33 M/S
LV SEPTAL E/E' RATIO: 8 M/S
LVED V (TEICH): 42.14 ML
LVED V (TEICH): 53.06 ML
LVES V (TEICH): 14.09 ML
LVES V (TEICH): 7.1 ML
LVOT MG: 6.02 MMHG
LVOT MV: 1.15 CM/S
MV PEAK A VEL: 1.12 M/S
MV PEAK E VEL: 0.48 M/S
MV STENOSIS PRESSURE HALF TIME: 59.08 MS
MV VALVE AREA P 1/2 METHOD: 3.72 CM2
OHS CV CPX 1 MINUTE RECOVERY HEART RATE: 123 BPM
OHS CV CPX 85 PERCENT MAX PREDICTED HEART RATE MALE: 131
OHS CV CPX ESTIMATED METS: 5
OHS CV CPX MAX PREDICTED HEART RATE: 154
OHS CV CPX PATIENT IS FEMALE: 1
OHS CV CPX PATIENT IS MALE: 0
OHS CV CPX PEAK DIASTOLIC BLOOD PRESSURE: 136 MMHG
OHS CV CPX PEAK HEAR RATE: 155 BPM
OHS CV CPX PEAK RATE PRESSURE PRODUCT: ABNORMAL
OHS CV CPX PEAK SYSTOLIC BLOOD PRESSURE: 231 MMHG
OHS CV CPX PERCENT MAX PREDICTED HEART RATE ACHIEVED: 105
OHS CV CPX RATE PRESSURE PRODUCT PRESENTING: ABNORMAL
OHS CV RV/LV RATIO: 0.91 CM
PISA TR MAX VEL: 2.63 M/S
PV PEAK GRADIENT: 4 MMHG
PV PEAK VELOCITY: 1.01 M/S
RA MAJOR: 4.59 CM
RA PRESSURE ESTIMATED: 3 MMHG
RA WIDTH: 3.9 CM
RIGHT VENTRICLE DIASTOLIC BASEL DIMENSION: 2.9 CM
RV TB RVSP: 6 MMHG
RV TISSUE DOPPLER FREE WALL SYSTOLIC VELOCITY 1 (APICAL 4 CHAMBER VIEW): 17.18 CM/S
SINUS: 3.4 CM
STJ: 2.85 CM
STRESS ECHO POST EXERCISE DUR MIN: 3 MINUTES
STRESS ECHO POST EXERCISE DUR SEC: 0 SECONDS
SYSTOLIC BLOOD PRESSURE: 114 MMHG
TDI LATERAL: 0.09 M/S
TDI SEPTAL: 0.06 M/S
TDI: 0.08 M/S
TR MAX PG: 28 MMHG
TRICUSPID ANNULAR PLANE SYSTOLIC EXCURSION: 2.7 CM
TV REST PULMONARY ARTERY PRESSURE: 31 MMHG
Z-SCORE OF LEFT VENTRICULAR DIMENSION IN END DIASTOLE: -5.59
Z-SCORE OF LEFT VENTRICULAR DIMENSION IN END DIASTOLE: -6.76
Z-SCORE OF LEFT VENTRICULAR DIMENSION IN END SYSTOLE: -4.79
Z-SCORE OF LEFT VENTRICULAR DIMENSION IN END SYSTOLE: -6.94

## 2024-10-15 PROCEDURE — 93306 TTE W/DOPPLER COMPLETE: CPT | Mod: 26,,, | Performed by: INTERNAL MEDICINE

## 2024-10-15 PROCEDURE — 93351 STRESS TTE COMPLETE: CPT | Mod: 26,,, | Performed by: INTERNAL MEDICINE

## 2024-10-15 PROCEDURE — 93306 TTE W/DOPPLER COMPLETE: CPT

## 2024-10-15 PROCEDURE — 93351 STRESS TTE COMPLETE: CPT

## 2024-10-16 NOTE — TELEPHONE ENCOUNTER
Staff contacted pt and left a detail message to her 000a appt on tomorrow with NP Ly Kaur (staff informed pt in message that provider will not be available and will have to canceled her tomorrow). Staff also asked pt to return call to the clinic to be reschedule to the next available date & time.  
Left

## 2024-10-17 ENCOUNTER — OFFICE VISIT (OUTPATIENT)
Dept: CARDIOLOGY | Facility: CLINIC | Age: 69
End: 2024-10-17
Payer: MEDICARE

## 2024-10-17 VITALS
BODY MASS INDEX: 36.43 KG/M2 | OXYGEN SATURATION: 98 % | DIASTOLIC BLOOD PRESSURE: 72 MMHG | SYSTOLIC BLOOD PRESSURE: 134 MMHG | WEIGHT: 218.88 LBS | HEART RATE: 87 BPM

## 2024-10-17 DIAGNOSIS — R07.89 OTHER CHEST PAIN: ICD-10-CM

## 2024-10-17 DIAGNOSIS — I10 PRIMARY HYPERTENSION: Primary | ICD-10-CM

## 2024-10-17 DIAGNOSIS — Z82.49 FAMILY HISTORY OF EARLY CAD: ICD-10-CM

## 2024-10-17 PROCEDURE — 1126F AMNT PAIN NOTED NONE PRSNT: CPT | Mod: CPTII,S$GLB,, | Performed by: INTERNAL MEDICINE

## 2024-10-17 PROCEDURE — 1101F PT FALLS ASSESS-DOCD LE1/YR: CPT | Mod: CPTII,S$GLB,, | Performed by: INTERNAL MEDICINE

## 2024-10-17 PROCEDURE — 99213 OFFICE O/P EST LOW 20 MIN: CPT | Mod: S$GLB,,, | Performed by: INTERNAL MEDICINE

## 2024-10-17 PROCEDURE — 3288F FALL RISK ASSESSMENT DOCD: CPT | Mod: CPTII,S$GLB,, | Performed by: INTERNAL MEDICINE

## 2024-10-17 PROCEDURE — 3075F SYST BP GE 130 - 139MM HG: CPT | Mod: CPTII,S$GLB,, | Performed by: INTERNAL MEDICINE

## 2024-10-17 PROCEDURE — 3078F DIAST BP <80 MM HG: CPT | Mod: CPTII,S$GLB,, | Performed by: INTERNAL MEDICINE

## 2024-10-17 PROCEDURE — 99999 PR PBB SHADOW E&M-EST. PATIENT-LVL III: CPT | Mod: PBBFAC,,, | Performed by: INTERNAL MEDICINE

## 2024-10-17 PROCEDURE — 1159F MED LIST DOCD IN RCRD: CPT | Mod: CPTII,S$GLB,, | Performed by: INTERNAL MEDICINE

## 2024-10-17 PROCEDURE — 3008F BODY MASS INDEX DOCD: CPT | Mod: CPTII,S$GLB,, | Performed by: INTERNAL MEDICINE

## 2024-10-17 PROCEDURE — 3066F NEPHROPATHY DOC TX: CPT | Mod: CPTII,S$GLB,, | Performed by: INTERNAL MEDICINE

## 2024-10-17 NOTE — PROGRESS NOTES
Cardiology    10/17/2024  2:38 PM    Problem list  Patient Active Problem List   Diagnosis    BMI 33.0-33.9,adult    Hypertension    Situational stress    Joint pain    Severe obesity (BMI 35.0-39.9) with comorbidity    Aortic atherosclerosis    Other chest pain    Family history of early CAD    History of rheumatic fever as a child       CC:  Follow-up    HPI:  She is here for follow-up test results.  Her echocardiogram showed good left ventricular function and no significant valvular disease.  Her stress echocardiogram was negative for ischemia.  She has bought a bicycle and wants to start exercising.    Medications  Current Outpatient Medications   Medication Sig Dispense Refill    losartan-hydrochlorothiazide 100-25 mg (HYZAAR) 100-25 mg per tablet Take 1 tablet by mouth.      LIDOcaine (LIDODERM) 5 % Place 1 patch onto the skin once daily. Remove & Discard patch within 12 hours or as directed by MD (Patient not taking: Reported on 10/17/2024) 15 patch 0    methylPREDNISolone (MEDROL DOSEPACK) 4 mg tablet use as directed (Patient not taking: Reported on 10/17/2024) 21 each 0    naproxen (NAPROSYN) 375 MG tablet Take 1 tablet (375 mg total) by mouth 2 (two) times daily with meals. (Patient not taking: Reported on 10/17/2024) 60 tablet 0     No current facility-administered medications for this visit.      Prior to Admission medications    Medication Sig Start Date End Date Taking? Authorizing Provider   losartan-hydrochlorothiazide 100-25 mg (HYZAAR) 100-25 mg per tablet Take 1 tablet by mouth. 11/13/23  Yes Provider, Historical   LIDOcaine (LIDODERM) 5 % Place 1 patch onto the skin once daily. Remove & Discard patch within 12 hours or as directed by MD  Patient not taking: Reported on 10/17/2024 8/6/24   Anabella Ricks FNP   methylPREDNISolone (MEDROL DOSEPACK) 4 mg tablet use as directed  Patient not taking: Reported on 10/17/2024 8/6/24   Anabella Ricks FNP   naproxen (NAPROSYN) 375 MG tablet Take  1 tablet (375 mg total) by mouth 2 (two) times daily with meals.  Patient not taking: Reported on 10/17/2024 8/6/24   Anabella Ricks, AKILA         History  Past Medical History:   Diagnosis Date    Hypertension     Urticaria      Past Surgical History:   Procedure Laterality Date    ADENOIDECTOMY      HYSTERECTOMY  01/01/1996    TONSILLECTOMY       Social History     Socioeconomic History    Marital status: Single   Occupational History     Employer: OTHER   Tobacco Use    Smoking status: Former     Types: Cigarettes    Smokeless tobacco: Never    Tobacco comments:     quit 27 years ago   Substance and Sexual Activity    Alcohol use: Not Currently     Alcohol/week: 1.0 standard drink of alcohol     Types: 1 Cans of beer per week     Comment: once a month     Drug use: No    Sexual activity: Not Currently     Social Drivers of Health     Financial Resource Strain: Medium Risk (3/8/2024)    Overall Financial Resource Strain (CARDIA)     Difficulty of Paying Living Expenses: Somewhat hard   Food Insecurity: No Food Insecurity (3/8/2024)    Hunger Vital Sign     Worried About Running Out of Food in the Last Year: Never true     Ran Out of Food in the Last Year: Never true   Transportation Needs: No Transportation Needs (3/8/2024)    PRAPARE - Transportation     Lack of Transportation (Medical): No     Lack of Transportation (Non-Medical): No   Physical Activity: Inactive (3/8/2024)    Exercise Vital Sign     Days of Exercise per Week: 0 days     Minutes of Exercise per Session: 0 min   Stress: No Stress Concern Present (3/8/2024)    [employer] [place] [employer] - Occupational Stress Questionnaire     Feeling of Stress : Only a little   Housing Stability: Unknown (3/8/2024)    Housing Stability Vital Sign     Unable to Pay for Housing in the Last Year: Patient declined     Number of Places Lived in the Last Year: 1     Unstable Housing in the Last Year: No         Allergies  Review of patient's allergies  indicates:   Allergen Reactions    Morphine Hives and Rash    Sulfa (sulfonamide antibiotics) Hives and Rash    Codeine Hives and Rash         Review of Systems   Review of Systems   Constitutional: Negative for decreased appetite, fever and weight loss.   HENT:  Negative for congestion and nosebleeds.    Eyes:  Negative for double vision, vision loss in left eye, vision loss in right eye and visual disturbance.   Cardiovascular:  Negative for chest pain, claudication, cyanosis, dyspnea on exertion, irregular heartbeat, leg swelling, near-syncope, orthopnea, palpitations, paroxysmal nocturnal dyspnea and syncope.   Respiratory:  Negative for cough, hemoptysis, shortness of breath, sleep disturbances due to breathing, snoring, sputum production and wheezing.    Endocrine: Negative for cold intolerance and heat intolerance.   Skin:  Negative for nail changes and rash.   Musculoskeletal:  Negative for joint pain, muscle cramps, muscle weakness and myalgias.   Gastrointestinal:  Negative for change in bowel habit, heartburn, hematemesis, hematochezia, hemorrhoids and melena.   Neurological:  Negative for dizziness, focal weakness and headaches.         Physical Exam  Wt Readings from Last 1 Encounters:   10/17/24 99.3 kg (218 lb 14.4 oz)     BP Readings from Last 3 Encounters:   10/17/24 134/72   10/15/24 114/64   10/15/24 114/64     Pulse Readings from Last 1 Encounters:   10/17/24 87     Body mass index is 36.43 kg/m².    Physical Exam  Constitutional:       Appearance: She is well-developed.   HENT:      Head: Atraumatic.   Eyes:      General: No scleral icterus.  Neck:      Vascular: Normal carotid pulses. No carotid bruit, hepatojugular reflux or JVD.   Cardiovascular:      Rate and Rhythm: Normal rate and regular rhythm.      Chest Wall: PMI is not displaced.      Pulses: Intact distal pulses.           Carotid pulses are 2+ on the right side and 2+ on the left side.       Radial pulses are 2+ on the right side  and 2+ on the left side.        Dorsalis pedis pulses are 2+ on the right side and 2+ on the left side.      Heart sounds: Normal heart sounds, S1 normal and S2 normal. No murmur heard.     No friction rub.   Pulmonary:      Effort: Pulmonary effort is normal. No respiratory distress.      Breath sounds: Normal breath sounds. No stridor. No wheezing or rales.   Chest:      Chest wall: No tenderness.   Abdominal:      General: Bowel sounds are normal.      Palpations: Abdomen is soft.   Musculoskeletal:      Cervical back: Neck supple. No edema.   Skin:     General: Skin is warm and dry.      Nails: There is no clubbing.   Neurological:      Mental Status: She is alert and oriented to person, place, and time.   Psychiatric:         Behavior: Behavior normal.         Thought Content: Thought content normal.             Assessment  1. Primary hypertension (Primary)  Well controlled.  Continue current medications and monitor.  Being managed by her PCP.    2. Other chest pain  Noncardiac.  Negative stress echocardiogram.    3. Family history of early CAD  Unchanged        Plan and Discussion  Discussed her echocardiogram and stress echocardiogram results.  Recommend to continue with medication, diet and start exercise regimen.  Patient will follow up with the PCP to manage her blood pressure.    Follow Up  PRN      Catalino Terry MD, F.A.C.C, F.S.C.A.I.      Total professional time spent for the encounter: 20 minutes  Time was spent preparing to see the patient, reviewing results of prior testing, obtaining and/or reviewing separately obtained history, performing a medically appropriate examination and interview, counseling and educating the patient/family, ordering medications/tests/procedures, referring and communicating with other health care professionals, documenting clinical information in the electronic health record, and independently interpreting results.    Disclaimer: This document was created using voice  recognition software (M*Modal Fluency Direct). Although it may be edited, this document may contain errors related to incorrect recognition of the spoken word. Please call the physician if clarification is needed.

## 2024-12-03 ENCOUNTER — OFFICE VISIT (OUTPATIENT)
Dept: GASTROENTEROLOGY | Facility: CLINIC | Age: 69
End: 2024-12-03
Payer: MEDICARE

## 2024-12-03 VITALS
SYSTOLIC BLOOD PRESSURE: 158 MMHG | HEIGHT: 65 IN | HEART RATE: 91 BPM | DIASTOLIC BLOOD PRESSURE: 87 MMHG | BODY MASS INDEX: 36.77 KG/M2 | WEIGHT: 220.69 LBS | OXYGEN SATURATION: 99 %

## 2024-12-03 DIAGNOSIS — R10.13 DYSPEPSIA: ICD-10-CM

## 2024-12-03 DIAGNOSIS — K59.04 CHRONIC IDIOPATHIC CONSTIPATION: Primary | ICD-10-CM

## 2024-12-03 PROCEDURE — 1159F MED LIST DOCD IN RCRD: CPT | Mod: HCWC,CPTII,S$GLB, | Performed by: STUDENT IN AN ORGANIZED HEALTH CARE EDUCATION/TRAINING PROGRAM

## 2024-12-03 PROCEDURE — 3066F NEPHROPATHY DOC TX: CPT | Mod: HCWC,CPTII,S$GLB, | Performed by: STUDENT IN AN ORGANIZED HEALTH CARE EDUCATION/TRAINING PROGRAM

## 2024-12-03 PROCEDURE — 3079F DIAST BP 80-89 MM HG: CPT | Mod: HCWC,CPTII,S$GLB, | Performed by: STUDENT IN AN ORGANIZED HEALTH CARE EDUCATION/TRAINING PROGRAM

## 2024-12-03 PROCEDURE — 3077F SYST BP >= 140 MM HG: CPT | Mod: HCWC,CPTII,S$GLB, | Performed by: STUDENT IN AN ORGANIZED HEALTH CARE EDUCATION/TRAINING PROGRAM

## 2024-12-03 PROCEDURE — 99999 PR PBB SHADOW E&M-EST. PATIENT-LVL IV: CPT | Mod: PBBFAC,HCWC,, | Performed by: STUDENT IN AN ORGANIZED HEALTH CARE EDUCATION/TRAINING PROGRAM

## 2024-12-03 PROCEDURE — 99203 OFFICE O/P NEW LOW 30 MIN: CPT | Mod: HCWC,S$GLB,, | Performed by: STUDENT IN AN ORGANIZED HEALTH CARE EDUCATION/TRAINING PROGRAM

## 2024-12-03 PROCEDURE — 3008F BODY MASS INDEX DOCD: CPT | Mod: HCWC,CPTII,S$GLB, | Performed by: STUDENT IN AN ORGANIZED HEALTH CARE EDUCATION/TRAINING PROGRAM

## 2024-12-03 NOTE — PROGRESS NOTES
[facility] Consultation Note    Reason for Consult:  The primary encounter diagnosis was Chronic idiopathic constipation. A diagnosis of Dyspepsia was also pertinent to this visit.    PCP:   Krishan Page   No address on file    Referring MD:  Self, Aaareferral  No address on file    HPI:  This is a 69 y.o. female here for evaluation of chronic constipation.    Patient reports eating a healthy diet and has increased fiber in her diet but constipation continues. Also reports using a squatty potty at home for many years.     Has never had an EGD or colonoscopy before. Denies any family hx of GI malignancy.     Patient is also reporting some epigastric abdominal discomfort.    Denies any recent weight loss or blood in stool.     Medical History:  has a past medical history of Hypertension and Urticaria.    Surgical History:  has a past surgical history that includes Hysterectomy (01/01/1996); Tonsillectomy; and Adenoidectomy.    Family History: family history includes Autoimmune disease in her mother; Heart disease in her father and mother; Lupus in her sister; Stroke in her father..     Social History:  reports that she has quit smoking. Her smoking use included cigarettes. She has never used smokeless tobacco. She reports that she does not currently use alcohol after a past usage of about 1.0 standard drink of alcohol per week. She reports that she does not use drugs.    Current Outpatient Medications on File Prior to Visit   Medication Sig Dispense Refill    losartan-hydrochlorothiazide 100-25 mg (HYZAAR) 100-25 mg per tablet Take 1 tablet by mouth.      LIDOcaine (LIDODERM) 5 % Place 1 patch onto the skin once daily. Remove & Discard patch within 12 hours or as directed by MD (Patient not taking: Reported on 10/17/2024) 15 patch 0    methylPREDNISolone (MEDROL DOSEPACK) 4 mg tablet use as directed (Patient not taking: Reported on 10/17/2024) 21 each 0    naproxen (NAPROSYN) 375 MG tablet Take 1  "tablet (375 mg total) by mouth 2 (two) times daily with meals. (Patient not taking: Reported on 10/17/2024) 60 tablet 0     No current facility-administered medications on file prior to visit.       Review of patient's allergies indicates:   Allergen Reactions    Morphine Hives and Rash    Sulfa (sulfonamide antibiotics) Hives and Rash    Codeine Hives and Rash          Objective Findings:    Vital Signs:  BP (!) 158/87 (BP Location: Right arm, Patient Position: Sitting)   Pulse 91   Ht 5' 5" (1.651 m)   Wt 100.1 kg (220 lb 10.9 oz)   SpO2 99%   BMI 36.72 kg/m²   Body mass index is 36.72 kg/m².    Physical Exam:  General Appearance: Well appearing in no acute distress  Head: Normocephalic, without obvious abnormality   Lungs: Non-labored breathing  Abdomen: Soft, non tender, non distended     Assessment:    Chronic constipation  Dyspepsia       Recommendations:  - Patient prefers to not take any prescription medications if possible. Going to trial taking daily polyethylene glycol 17g.   - Discussed bidirectional endoscopy for evaluation of dyspepsia and to complete colon cancer screening. Patient agreed to proceed. Risks/benefits discussed and all questions answered.     RTC 3 months      Thank you so much for allowing me to participate in the care of Jailyn Multani MD  [facility]     I spent a total of 30 minutes on the day of the visit.  This includes face to face time and non-face to face time preparing to see the patient (eg, review of tests), obtaining and/or reviewing separately obtained history, documenting clinical information in the electronic or other health record, independently interpreting results and communicating results to the patient/family/caregiver, or care coordinator.              "

## 2024-12-04 ENCOUNTER — TELEPHONE (OUTPATIENT)
Dept: ENDOSCOPY | Facility: HOSPITAL | Age: 69
End: 2024-12-04
Payer: MEDICARE

## 2024-12-04 NOTE — TELEPHONE ENCOUNTER
"----- Message from Ted Multani MD sent at 12/3/2024  2:18 PM CST -----  Regarding: EGD and colonoscopy  Procedure: EGD/Colonoscopy    Diagnosis: Screening colonoscopy + EGD for dyspepsia    Procedure Timin-12 weeks    #If within 4 weeks selected, please kaitlin as high priority#    #If greater than 12 weeks, please select "5-12 weeks" and delay sending until 3 months prior to requested date#     Location: Any Site    Additional Scheduling Information: No scheduling concerns    Prep Specifications:Extended/Constipation prep    Is the patient taking a GLP-1 Agonist:no    Have you attached a patient to this message: yes  "

## 2024-12-04 NOTE — TELEPHONE ENCOUNTER
Contacted the patient to schedule an endoscopy procedure(s) egd/colonoscopy. The patient did not answer the call and left a voice message requesting a call back.

## 2024-12-09 ENCOUNTER — TELEPHONE (OUTPATIENT)
Dept: ENDOSCOPY | Facility: HOSPITAL | Age: 69
End: 2024-12-09
Payer: MEDICARE

## 2024-12-09 NOTE — TELEPHONE ENCOUNTER
"----- Message from Elina sent at 2024  8:05 AM CST -----  Regarding: FW: EGD and colonoscopy    ----- Message -----  From: Ted Multani MD  Sent: 12/3/2024   2:18 PM CST  To: [facility]  Subject: EGD and colonoscopy                              Procedure: EGD/Colonoscopy    Diagnosis: Screening colonoscopy + EGD for dyspepsia    Procedure Timin-12 weeks    #If within 4 weeks selected, please kaitlin as high priority#    #If greater than 12 weeks, please select "5-12 weeks" and delay sending until 3 months prior to requested date#     Location: Any Site    Additional Scheduling Information: No scheduling concerns    Prep Specifications:Extended/Constipation prep    Is the patient taking a GLP-1 Agonist:no    Have you attached a patient to this message: yes  "

## 2024-12-10 ENCOUNTER — OFFICE VISIT (OUTPATIENT)
Dept: PODIATRY | Facility: CLINIC | Age: 69
End: 2024-12-10
Payer: MEDICARE

## 2024-12-10 VITALS — HEIGHT: 65 IN | WEIGHT: 220.69 LBS | BODY MASS INDEX: 36.77 KG/M2

## 2024-12-10 DIAGNOSIS — M19.071 ARTHRITIS OF RIGHT MIDFOOT: ICD-10-CM

## 2024-12-10 DIAGNOSIS — L60.0 ONYCHOCRYPTOSIS: Primary | ICD-10-CM

## 2024-12-10 DIAGNOSIS — B35.1 ONYCHOMYCOSIS: ICD-10-CM

## 2024-12-10 PROCEDURE — 99999 PR PBB SHADOW E&M-EST. PATIENT-LVL III: CPT | Mod: PBBFAC,HCWC,, | Performed by: PODIATRIST

## 2024-12-10 RX ORDER — UREA 40 %
CREAM (GRAM) TOPICAL DAILY
Qty: 85 G | Refills: 11 | Status: SHIPPED | OUTPATIENT
Start: 2024-12-10

## 2024-12-10 RX ORDER — DICLOFENAC SODIUM 10 MG/G
2 GEL TOPICAL 4 TIMES DAILY
Qty: 100 G | Refills: 2 | Status: SHIPPED | OUTPATIENT
Start: 2024-12-10

## 2024-12-10 NOTE — PROGRESS NOTES
Subjective:     Patient ID: Jailyn Braun is a 69 y.o. female.    Chief Complaint: Ingrown Toenail (3rd R digit)    Jailyn is a 69 y.o. female who presents to the clinic complaining of painful ingrown toenail on the right foot. Pt reports pain at medial aspects of R 3rd and 4th digits due tendancy of nails to grow in towards her skin. Pt reports history of onychomycosis treated with regular nail filing and topical antifungal. Today, pt nails are painted and well trimmed, with minimal pain at the affected toes. Pt also complains of a gradual increase in dull R midfoot pain. Pt reports history of multiple different fractures of R metatarsals. Pt also reports recent increase in body weight. Pt denies any other pedal complaints.    Review of Systems   Constitutional: Negative.   Cardiovascular: Negative.    Respiratory: Negative.     Skin: Negative.    Neurological: Negative.    Psychiatric/Behavioral: Negative.          Objective:     Physical Exam  Cardiovascular:      Pulses:           Dorsalis pedis pulses are 1+ on the right side.        Posterior tibial pulses are 1+ on the right side.   Musculoskeletal:      Right foot: Normal range of motion. No deformity.   Feet:      Right foot:      Skin integrity: Skin integrity normal.      Toenail Condition: Right toenails are ingrown. Fungal disease present.     Comments: No gross abnormality or wound R foot. Diffuse, mild R midfoot pain on palpation and with ROM, mild pain on palpation of medial R 3rd and 4th digits      Assessment:      Encounter Diagnoses   Name Primary?    Onychocryptosis Yes    Onychomycosis     Arthritis of right midfoot      Plan:     Jailyn was seen today for ingrown toenail.    Diagnoses and all orders for this visit:    Onychocryptosis    Onychomycosis    Arthritis of right midfoot    Other orders  -     urea (CARMOL) 40 % Crea; Apply topically once daily.  -     diclofenac sodium (VOLTAREN) 1 % Gel; Apply 2 g topically 4 (four) times  daily.      I counseled the patient on her conditions, their implications and medical management.  Urea topical prescribed, to be used daily around affected right foot toe nails.  Voltaren topical prescribed, to be used daily to right midfoot.  Patient declined further intervention to resect offending nail borders at this time, and declined need for right foot xray.  Patient to return to clinic as needed.

## 2024-12-11 ENCOUNTER — TELEPHONE (OUTPATIENT)
Dept: ENDOSCOPY | Facility: HOSPITAL | Age: 69
End: 2024-12-11
Payer: MEDICARE

## 2024-12-11 VITALS — WEIGHT: 220 LBS | BODY MASS INDEX: 36.65 KG/M2 | HEIGHT: 65 IN

## 2024-12-11 DIAGNOSIS — Z12.11 COLON CANCER SCREENING: ICD-10-CM

## 2024-12-11 DIAGNOSIS — R10.13 DYSPEPSIA: Primary | ICD-10-CM

## 2024-12-11 NOTE — TELEPHONE ENCOUNTER
----- Message from Med Assistant Duckworth sent at 12/9/2024  3:07 PM CST -----  Regarding: FW: self  Patient is returning call to schedule her procedure.  ----- Message -----  From: Stephon Mack  Sent: 12/9/2024   2:55 PM CST  To: Rangel Jean Staff  Subject: self                                             Type: Patient Call Back    Who called:self    What is the request in detail:calling to speak with the office, was told she would get a call to ticole her for the scope, hasn't heard from anyone in the office as of yet     Can the clinic reply by [facility]?no    Would the patient rather a call back or a response via [facility]? callback    Best call back number:[phone]    Additional Information:

## 2024-12-11 NOTE — PROGRESS NOTES
I have reviewed the notes, assessments, and/or procedures performed by charan baldwin [email or web address], I concur with her/his documentation of Jailyn Braun.  Date of Service: 12/10/2024

## 2024-12-23 DIAGNOSIS — Z12.11 ENCOUNTER FOR SCREENING COLONOSCOPY: Primary | ICD-10-CM

## 2024-12-23 NOTE — TELEPHONE ENCOUNTER
Referral for procedure from [facility]      Spoke to patient to schedule procedure(s) Colonoscopy/EGD       Physician to perform procedure(s) Dr. HENRRY Multani  Date of Procedure (s) 02/05/2025  Arrival Time 12:00 Pm   Time of Procedure(s) 1:00 PM    Location of Procedure(s) [address]   Type of Rx Prep sent to patient: Suprep  Instructions provided to patient via [facility]    Patient was informed on the following information and verbalized understanding. Screening questionnaire reviewed with patient and complete. If procedure requires anesthesia, a responsible adult needs to be present to accompany the patient home, patient cannot drive after receiving anesthesia. Appointment details are tentative, especially check-in time. Patient will receive a prep-op call 7 days prior to confirm check-in time for procedure. If applicable the patient should contact their pharmacy to verify Rx for procedure prep is ready for pick-up. Patient was advised to call the scheduling department at [phone] if pharmacy states no Rx is available. Patient was advised to call the endoscopy scheduling department if any questions or concerns arise.       Endoscopy Scheduling Department

## 2024-12-24 RX ORDER — SODIUM, POTASSIUM,MAG SULFATES 17.5-3.13G
1 SOLUTION, RECONSTITUTED, ORAL ORAL DAILY
Qty: 1 KIT | Refills: 0 | Status: SHIPPED | OUTPATIENT
Start: 2024-12-24 | End: 2024-12-26

## 2025-01-31 ENCOUNTER — TELEPHONE (OUTPATIENT)
Dept: GASTROENTEROLOGY | Facility: CLINIC | Age: 70
End: 2025-01-31
Payer: MEDICARE

## 2025-01-31 NOTE — TELEPHONE ENCOUNTER
----- Message from Jeanie sent at 1/31/2025  3:05 PM CST -----  Type:  Needs Medical Advice    Who Called: LAMIN MACE [[id number]]    Would the patient rather a call back or a response via [facility]? Call back     Best Call Back Number: [phone]     Additional Information: Patient states she would like to reschedule procedure on 2/5 to a later date if possible. Patient would like a call back with further assistance.

## 2025-02-01 ENCOUNTER — TELEPHONE (OUTPATIENT)
Dept: ENDOSCOPY | Facility: HOSPITAL | Age: 70
End: 2025-02-01
Payer: MEDICARE

## 2025-02-01 DIAGNOSIS — Z12.11 ENCOUNTER FOR SCREENING COLONOSCOPY: Primary | ICD-10-CM

## 2025-02-01 RX ORDER — SODIUM, POTASSIUM,MAG SULFATES 17.5-3.13G
1 SOLUTION, RECONSTITUTED, ORAL ORAL DAILY
Qty: 1 KIT | Refills: 0 | Status: SHIPPED | OUTPATIENT
Start: 2025-02-01 | End: 2025-02-03

## 2025-02-01 NOTE — TELEPHONE ENCOUNTER
Referral for procedure from [facility]      Spoke to patient to schedule procedure(s) Colonoscopy/EGD       Physician to perform procedure(s) Dr. ANN-MARIE Garland  Date of Procedure (s) 03/14/2025  Arrival Time 9:15 Am   Time of Procedure(s) 10:15 Am    Location of Procedure(s) [place] [address] Floor  Type of Rx Prep sent to patient: Suprep  Instructions provided to patient via [facility]    Patient was informed on the following information and verbalized understanding. Screening questionnaire reviewed with patient and complete. If procedure requires anesthesia, a responsible adult needs to be present to accompany the patient home, patient cannot drive after receiving anesthesia. Appointment details are tentative, especially check-in time. Patient will receive a prep-op call 7 days prior to confirm check-in time for procedure. If applicable the patient should contact their pharmacy to verify Rx for procedure prep is ready for pick-up. Patient was advised to call the scheduling department at [phone] if pharmacy states no Rx is available. Patient was advised to call the endoscopy scheduling department if any questions or concerns arise.       Endoscopy Scheduling Department

## 2025-02-01 NOTE — TELEPHONE ENCOUNTER
Return call to patient. Reschedule egd/colonoscopy. Inform patient that she will need to prep for 2 days due to chronic constipation on provider order. Patient stated that she does not have that problem anymore, but will do what is best for her health. R/s patient procedure and 2nd kit of suprep sent to pharmacy. Updated instructions to portal. Patient verbalized understanding.

## 2025-03-07 ENCOUNTER — ANESTHESIA EVENT (OUTPATIENT)
Dept: ENDOSCOPY | Facility: HOSPITAL | Age: 70
End: 2025-03-07
Payer: MEDICARE

## 2025-03-07 NOTE — ANESTHESIA PREPROCEDURE EVALUATION
03/07/2025  Jailyn Braun is a 69 y.o., female.    Procedure: EGD (ESOPHAGOGASTRODUODENOSCOPY) (N/A), COLONOSCOPY, SCREENING, LOW RISK PATIENT (N/A)       Problem List[1]    Past Medical History:   Diagnosis Date    Hypertension     Urticaria        ECHO: Results for orders placed during the hospital encounter of 10/15/24    Echo    Interpretation Summary    Left Ventricle: The left ventricle is normal in size. Mildly increased wall thickness. There is normal systolic function with a visually estimated ejection fraction of 60 - 65%. Grade I diastolic dysfunction.    Right Ventricle: Normal right ventricular cavity size. Wall thickness is normal. Systolic function is normal.    Pulmonary Artery: The estimated pulmonary artery systolic pressure is 31 mmHg.    IVC/SVC: Normal venous pressure at 3 mmHg.      There is no height or weight on file to calculate BMI.    Tobacco Use: Medium Risk (12/10/2024)    Patient History     Smoking Tobacco Use: Former     Smokeless Tobacco Use: Never     Passive Exposure: Not on file       Social History     Substance and Sexual Activity   Drug Use No        Alcohol Use: Not At Risk (3/8/2024)    AUDIT-C     Frequency of Alcohol Consumption: Never     Average Number of Drinks: Patient does not drink     Frequency of Binge Drinking: Never       Review of patient's allergies indicates:   Allergen Reactions    Morphine Hives and Rash    Sulfa (sulfonamide antibiotics) Hives and Rash    Keflex [cephalexin] Hives    Codeine Hives and Rash         Airway:  No value filed.    Pre-op Assessment    I have reviewed the Patient Summary Reports.    I have reviewed the NPO Status.   I have reviewed the Medications.     Review of Systems  Anesthesia Hx:             Denies Family Hx of Anesthesia complications.    Denies Personal Hx of Anesthesia complications.                     Hematology/Oncology:  Hematology Normal   Oncology Normal                                   EENT/Dental:  EENT/Dental Normal           Cardiovascular:     Hypertension                                          Pulmonary:  Pulmonary Normal                       Renal/:  Renal/ Normal                 Hepatic/GI:  Hepatic/GI Normal                    Musculoskeletal:  Musculoskeletal Normal                Neurological:  Neurology Normal                                      Endocrine:  Endocrine Normal          Obesity / BMI > 30  Dermatological:  Skin Normal    Psych:  Psychiatric Normal                  Physical Exam  General: Well nourished    Airway:  Mallampati: II   TM Distance: Normal  Neck ROM: Normal ROM    Dental:  Intact    Chest/Lungs:  Clear to auscultation    Heart:  Rate: Normal  Rhythm: Regular Rhythm    Anesthesia Plan  Type of Anesthesia, risks & benefits discussed:    Anesthesia Type: Gen Natural Airway  Intra-op Monitoring Plan: Standard ASA Monitors  Post Op Pain Control Plan: multimodal analgesia  Induction:  IV  Informed Consent: Informed consent signed with the Patient and all parties understand the risks and agree with anesthesia plan.  All questions answered.   ASA Score: 3  Day of Surgery Review of History & Physical: H&P Update referred to the surgeon/provider.    Ready For Surgery From Anesthesia Perspective.     .             [1]  Patient Active Problem List  Diagnosis    BMI 33.0-33.9,adult    Hypertension    Situational stress    Joint pain    Severe obesity (BMI 35.0-39.9) with comorbidity    Aortic atherosclerosis    Other chest pain    Family history of early CAD    History of rheumatic fever as a child    Chronic idiopathic constipation    Dyspepsia

## 2025-03-12 ENCOUNTER — TELEPHONE (OUTPATIENT)
Dept: ENDOSCOPY | Facility: HOSPITAL | Age: 70
End: 2025-03-12
Payer: MEDICARE

## 2025-03-12 NOTE — TELEPHONE ENCOUNTER
Received inbasket message regarding upcoming EGD and Colonoscopy on 3/14/25. Instructions reviewed with pt and all questions answered. Pt verbalized understanding.

## 2025-03-12 NOTE — TELEPHONE ENCOUNTER
----- Message from Elina sent at 3/10/2025  9:01 AM CDT -----    ----- Message -----  From: Khalida Landry  Sent: 3/6/2025   9:42 AM CDT  To: [facility] Endoscopy Schedulers    Patient would like a call back to go over her prep instructions.

## 2025-03-14 ENCOUNTER — ANESTHESIA (OUTPATIENT)
Dept: ENDOSCOPY | Facility: HOSPITAL | Age: 70
End: 2025-03-14
Payer: MEDICARE

## 2025-03-14 ENCOUNTER — HOSPITAL ENCOUNTER (OUTPATIENT)
Facility: HOSPITAL | Age: 70
Discharge: HOME | End: 2025-03-14
Attending: INTERNAL MEDICINE | Admitting: INTERNAL MEDICINE
Payer: MEDICARE

## 2025-03-14 VITALS
RESPIRATION RATE: 18 BRPM | TEMPERATURE: 97 F | OXYGEN SATURATION: 97 % | WEIGHT: 213 LBS | BODY MASS INDEX: 35.45 KG/M2 | SYSTOLIC BLOOD PRESSURE: 139 MMHG | HEART RATE: 60 BPM | DIASTOLIC BLOOD PRESSURE: 73 MMHG

## 2025-03-14 DIAGNOSIS — R10.13 DYSPEPSIA: Primary | ICD-10-CM

## 2025-03-14 DIAGNOSIS — Z12.11 COLON CANCER SCREENING: ICD-10-CM

## 2025-03-14 PROCEDURE — G0121 COLON CA SCRN NOT HI RSK IND: HCPCS | Mod: HCWC | Performed by: INTERNAL MEDICINE

## 2025-03-14 PROCEDURE — 99900035 HC TECH TIME PER 15 MIN (STAT): Mod: HCWC

## 2025-03-14 PROCEDURE — 25000003 PHARM REV CODE 250: Mod: HCWC | Performed by: INTERNAL MEDICINE

## 2025-03-14 PROCEDURE — 88305 TISSUE EXAM BY PATHOLOGIST: CPT | Mod: HCWC | Performed by: PATHOLOGY

## 2025-03-14 PROCEDURE — G0121 COLON CA SCRN NOT HI RSK IND: HCPCS | Mod: HCWC,,, | Performed by: INTERNAL MEDICINE

## 2025-03-14 PROCEDURE — 63600175 PHARM REV CODE 636 W HCPCS: Mod: HCWC | Performed by: NURSE ANESTHETIST, CERTIFIED REGISTERED

## 2025-03-14 PROCEDURE — 88305 TISSUE EXAM BY PATHOLOGIST: CPT | Mod: 26,HCWC,, | Performed by: PATHOLOGY

## 2025-03-14 PROCEDURE — 25000003 PHARM REV CODE 250: Mod: HCWC | Performed by: NURSE ANESTHETIST, CERTIFIED REGISTERED

## 2025-03-14 PROCEDURE — 37000008 HC ANESTHESIA 1ST 15 MINUTES: Mod: HCWC | Performed by: INTERNAL MEDICINE

## 2025-03-14 PROCEDURE — 43239 EGD BIOPSY SINGLE/MULTIPLE: CPT | Mod: HCWC | Performed by: INTERNAL MEDICINE

## 2025-03-14 PROCEDURE — 94760 N-INVAS EAR/PLS OXIMETRY 1: CPT | Mod: HCWC

## 2025-03-14 PROCEDURE — 43239 EGD BIOPSY SINGLE/MULTIPLE: CPT | Mod: 51,HCWC,, | Performed by: INTERNAL MEDICINE

## 2025-03-14 PROCEDURE — 37000009 HC ANESTHESIA EA ADD 15 MINS: Mod: HCWC | Performed by: INTERNAL MEDICINE

## 2025-03-14 PROCEDURE — 94799 UNLISTED PULMONARY SVC/PX: CPT | Mod: HCWC

## 2025-03-14 PROCEDURE — 27201012 HC FORCEPS, HOT/COLD, DISP: Mod: HCWC | Performed by: INTERNAL MEDICINE

## 2025-03-14 RX ORDER — LIDOCAINE HYDROCHLORIDE 20 MG/ML
INJECTION INTRAVENOUS
Status: DISCONTINUED | OUTPATIENT
Start: 2025-03-14 | End: 2025-03-14

## 2025-03-14 RX ORDER — PROPOFOL 10 MG/ML
VIAL (ML) INTRAVENOUS CONTINUOUS PRN
Status: DISCONTINUED | OUTPATIENT
Start: 2025-03-14 | End: 2025-03-14

## 2025-03-14 RX ORDER — TOPICAL ANESTHETIC 200 MG/ML
SPRAY DENTAL; PERIODONTAL
Status: DISCONTINUED | OUTPATIENT
Start: 2025-03-14 | End: 2025-03-14

## 2025-03-14 RX ORDER — PROPOFOL 10 MG/ML
VIAL (ML) INTRAVENOUS
Status: DISCONTINUED | OUTPATIENT
Start: 2025-03-14 | End: 2025-03-14

## 2025-03-14 RX ORDER — SODIUM CHLORIDE 9 MG/ML
INJECTION, SOLUTION INTRAVENOUS CONTINUOUS
Status: DISCONTINUED | OUTPATIENT
Start: 2025-03-14 | End: 2025-03-14 | Stop reason: HOSPADM

## 2025-03-14 RX ADMIN — GLYCOPYRROLATE 0.1 MG: 0.2 INJECTION INTRAMUSCULAR; INTRAVENOUS at 10:03

## 2025-03-14 RX ADMIN — TOPICAL ANESTHETIC 1 EACH: 200 SPRAY DENTAL; PERIODONTAL at 10:03

## 2025-03-14 RX ADMIN — SODIUM CHLORIDE: 0.9 INJECTION, SOLUTION INTRAVENOUS at 09:03

## 2025-03-14 RX ADMIN — LIDOCAINE HYDROCHLORIDE 50 MG: 20 INJECTION INTRAVENOUS at 10:03

## 2025-03-14 RX ADMIN — PROPOFOL 150 MCG/KG/MIN: 10 INJECTION, EMULSION INTRAVENOUS at 10:03

## 2025-03-14 RX ADMIN — PROPOFOL 100 MG: 10 INJECTION, EMULSION INTRAVENOUS at 10:03

## 2025-03-14 NOTE — PROVATION PATIENT INSTRUCTIONS
Discharge Summary/Instructions after an Endoscopic Procedure  Patient Name: Jailyn Braun  Patient MRN: [id number]  Patient YOB: 1955  Friday, March 14, 2025  Cj Garland MD  Dear patient,  As a result of recent federal legislation (The Federal Cures Act), you may   receive lab or pathology results from your procedure in your [facility]   account before your physician is able to contact you. Your physician or   their representative will relay the results to you with their   recommendations at their soonest availability.  Thank you,  RESTRICTIONS:  During your procedure today, you received medications for sedation.  These   medications may affect your judgment, balance and coordination.  Therefore,   for 24 hours, you have the following restrictions:   - DO NOT drive a car, operate machinery, make legal/financial decisions,   sign important papers or drink alcohol.    ACTIVITY:  Today: no heavy lifting, straining or running due to procedural   sedation/anesthesia.  The following day: return to full activity including work.  DIET:  Eat and drink normally unless instructed otherwise.     TREATMENT FOR COMMON SIDE EFFECTS:  - Mild abdominal pain, nausea, belching, bloating or excessive gas:  rest,   eat lightly and use a heating pad.  - Sore Throat: treat with throat lozenges and/or gargle with warm salt   water.  - Because air was used during the procedure, expelling large amounts of air   from your rectum or belching is normal.  - If a bowel prep was taken, you may not have a bowel movement for 1-3 days.    This is normal.  SYMPTOMS TO WATCH FOR AND REPORT TO YOUR PHYSICIAN:  1. Abdominal pain or bloating, other than gas cramps.  2. Chest pain.  3. Back pain.  4. Signs of infection such as: chills or fever occurring within 24 hours   after the procedure.  5. Rectal bleeding, which would show as bright red, maroon, or black stools.   (A tablespoon of blood from the rectum is not serious, especially if    hemorrhoids are present.)  6. Vomiting.  7. Weakness or dizziness.  GO DIRECTLY TO THE NEAREST EMERGENCY ROOM IF YOU HAVE ANY OF THE FOLLOWING:      Difficulty breathing              Chills and/or fever over 101 F   Persistent vomiting and/or vomiting blood   Severe abdominal pain   Severe chest pain   Black, tarry stools   Bleeding- more than one tablespoon   Any other symptom or condition that you feel may need urgent attention  Your doctor recommends these additional instructions:  If any biopsies were taken, your doctors clinic will contact you in 1 to 2   weeks with any results.  - Patient has a contact number available for emergencies.  The signs and   symptoms of potential delayed complications were discussed with the   patient.  Return to normal activities tomorrow.  Written discharge   instructions were provided to the patient.   - Discharge patient to home.   - Resume previous diet.   - Continue present medications.   - Repeat colonoscopy in 10 years for screening purposes.   For questions, problems or results please call your physician - Cj Garland MD at Work:  [phone].  [facility] [place] [facility], EMERGENCY ROOM PHONE NUMBER: [phone]  IF A COMPLICATION OR EMERGENCY SITUATION ARISES AND YOU ARE UNABLE TO REACH   YOUR PHYSICIAN - GO DIRECTLY TO THE EMERGENCY ROOM.  Cj Garland MD  3/14/2025 10:44:43 AM  This report has been verified and signed electronically.  Dear patient,  As a result of recent federal legislation (The Federal Cures Act), you may   receive lab or pathology results from your procedure in your [facility]   account before your physician is able to contact you. Your physician or   their representative will relay the results to you with their   recommendations at their soonest availability.  Thank you,  PROVATION

## 2025-03-14 NOTE — PLAN OF CARE
Pt in preop bay 35, VSS and IV inserted. Pt denies any open wounds on body or the use of any weight loss injections. Pt needs an  updated H&P, procedural consents, an admit order and anesthesia consents, otherwise ready to roll.

## 2025-03-14 NOTE — PROVATION PATIENT INSTRUCTIONS
Discharge Summary/Instructions after an Endoscopic Procedure  Patient Name: Jailyn Braun  Patient MRN: [id number]  Patient YOB: 1955  Friday, March 14, 2025  Cj Garland MD  Dear patient,  As a result of recent federal legislation (The Federal Cures Act), you may   receive lab or pathology results from your procedure in your [facility]   account before your physician is able to contact you. Your physician or   their representative will relay the results to you with their   recommendations at their soonest availability.  Thank you,  RESTRICTIONS:  During your procedure today, you received medications for sedation.  These   medications may affect your judgment, balance and coordination.  Therefore,   for 24 hours, you have the following restrictions:   - DO NOT drive a car, operate machinery, make legal/financial decisions,   sign important papers or drink alcohol.    ACTIVITY:  Today: no heavy lifting, straining or running due to procedural   sedation/anesthesia.  The following day: return to full activity including work.  DIET:  Eat and drink normally unless instructed otherwise.     TREATMENT FOR COMMON SIDE EFFECTS:  - Mild abdominal pain, nausea, belching, bloating or excessive gas:  rest,   eat lightly and use a heating pad.  - Sore Throat: treat with throat lozenges and/or gargle with warm salt   water.  - Because air was used during the procedure, expelling large amounts of air   from your rectum or belching is normal.  - If a bowel prep was taken, you may not have a bowel movement for 1-3 days.    This is normal.  SYMPTOMS TO WATCH FOR AND REPORT TO YOUR PHYSICIAN:  1. Abdominal pain or bloating, other than gas cramps.  2. Chest pain.  3. Back pain.  4. Signs of infection such as: chills or fever occurring within 24 hours   after the procedure.  5. Rectal bleeding, which would show as bright red, maroon, or black stools.   (A tablespoon of blood from the rectum is not serious, especially if    hemorrhoids are present.)  6. Vomiting.  7. Weakness or dizziness.  GO DIRECTLY TO THE NEAREST EMERGENCY ROOM IF YOU HAVE ANY OF THE FOLLOWING:      Difficulty breathing              Chills and/or fever over 101 F   Persistent vomiting and/or vomiting blood   Severe abdominal pain   Severe chest pain   Black, tarry stools   Bleeding- more than one tablespoon   Any other symptom or condition that you feel may need urgent attention  Your doctor recommends these additional instructions:  If any biopsies were taken, your doctors clinic will contact you in 1 to 2   weeks with any results.  - Patient has a contact number available for emergencies.  The signs and   symptoms of potential delayed complications were discussed with the   patient.  Return to normal activities tomorrow.  Written discharge   instructions were provided to the patient.   - Discharge patient to home.   - Resume previous diet.   - Continue present medications.   - Await pathology results.   For questions, problems or results please call your physician - Cj Garland MD at Work:  [phone].  [facility] [place] [facility], EMERGENCY ROOM PHONE NUMBER: [phone]  IF A COMPLICATION OR EMERGENCY SITUATION ARISES AND YOU ARE UNABLE TO REACH   YOUR PHYSICIAN - GO DIRECTLY TO THE EMERGENCY ROOM.  Cj Garland MD  3/14/2025 10:25:51 AM  This report has been verified and signed electronically.  Dear patient,  As a result of recent federal legislation (The Federal Cures Act), you may   receive lab or pathology results from your procedure in your [facility]   account before your physician is able to contact you. Your physician or   their representative will relay the results to you with their   recommendations at their soonest availability.  Thank you,  PROVATION

## 2025-03-14 NOTE — H&P
Short Stay Endoscopy History and Physical    PCP - Krishan Page MD    Procedure - EGD/Colonoscopy  Sedation: GA  ASA - per anesthesia  Mallampati - per anesthesia  History of Anesthesia problems - no  Family history Anesthesia problems -  no     HPI:  This is a 69 y.o. female here for evaluation of : Dyspepsia and Screening for CRC    Reflux - no  Dysphagia - no  Abdominal pain - no  Diarrhea - no    ROS:  Constitutional: No fevers, chills, No weight loss  ENT: No allergies  CV: No chest pain  Pulm: No cough, No shortness of breath  Ophtho: No vision changes  GI: see HPI  Medical History:  has a past medical history of Hypertension and Urticaria.    Surgical History:  has a past surgical history that includes Hysterectomy (01/01/1996); Tonsillectomy; and Adenoidectomy.    Family History: family history includes Autoimmune disease in her mother; Heart disease in her father and mother; Lupus in her sister; Stroke in her father.. Otherwise no colon cancer, inflammatory bowel disease, or GI malignancies.    Social History:  reports that she has quit smoking. Her smoking use included cigarettes. She has never used smokeless tobacco. She reports that she does not currently use alcohol after a past usage of about 1.0 standard drink of alcohol per week. She reports that she does not use drugs.    Review of patient's allergies indicates:   Allergen Reactions    Morphine Hives and Rash    Sulfa (sulfonamide antibiotics) Hives and Rash    Keflex [cephalexin] Hives    Codeine Hives and Rash       Medications:   Prescriptions Prior to Admission[1]    Objective Findings:    Vital Signs: Per nursing notes.    Physical Exam:  General Appearance: Well appearing in no acute distress  Head:   Normocephalic, without obvious abnormality  Eyes:    No scleral icterus  Airway: Open  Neck: No restriction in mobility  Lungs: CTA bilaterally in anterior and posterior fields, no wheezes, no crackles.  Heart:  Regular rate and rhythm, S1, S2  normal, no murmurs heard  Abdomen: Soft, non tender, non distended      Labs:  Lab Results   Component Value Date    WBC 7.24 08/06/2024    HGB 12.1 08/06/2024    HCT 38.0 08/06/2024     08/06/2024    CHOL 228 (H) 08/17/2023    TRIG 118 08/17/2023    HDL 38 (L) 08/17/2023    ALT 11 08/06/2024    AST 16 08/06/2024     08/06/2024    K 3.5 08/06/2024     08/06/2024    CREATININE 0.8 08/06/2024    BUN 12 08/06/2024    CO2 19 (L) 08/06/2024    TSH 2.154 08/17/2023         I have explained the risks and benefits of endoscopy procedures to the patient including but not limited to bleeding, perforation, infection, and death.    Thank you so much for allowing me to participate in the care of Jailyn Aparna Garland MD         [1]   Medications Prior to Admission   Medication Sig Dispense Refill Last Dose/Taking    diclofenac sodium (VOLTAREN) 1 % Gel Apply 2 g topically 4 (four) times daily. 100 g 2     LIDOcaine (LIDODERM) 5 % Place 1 patch onto the skin once daily. Remove & Discard patch within 12 hours or as directed by MD (Patient not taking: Reported on 10/17/2024) 15 patch 0     losartan-hydrochlorothiazide 100-25 mg (HYZAAR) 100-25 mg per tablet Take 1 tablet by mouth.       methylPREDNISolone (MEDROL DOSEPACK) 4 mg tablet use as directed (Patient not taking: Reported on 10/17/2024) 21 each 0     urea (CARMOL) 40 % Crea Apply topically once daily. 85 g 11

## 2025-03-14 NOTE — TRANSFER OF CARE
Anesthesia Transfer of Care Note    Patient: Jailyn Braun    Procedure(s) Performed: Procedure(s) (LRB):  EGD (ESOPHAGOGASTRODUODENOSCOPY) (N/A)  COLONOSCOPY, SCREENING, LOW RISK PATIENT (N/A)    Patient location: PACU    Anesthesia Type: general    Transport from OR: Transported from OR on 6-10 L/min O2 by face mask with adequate spontaneous ventilation    Post pain: adequate analgesia    Post assessment: no apparent anesthetic complications    Post vital signs: stable    Level of consciousness: sedated    Nausea/Vomiting: no nausea/vomiting    Complications: none    Transfer of care protocol was followed      Last vitals: Visit Vitals  /78   Pulse 96   Temp 36.8 °C (98.2 °F)   Resp 18   Wt 96.6 kg (213 lb)   SpO2 99%   Breastfeeding No   BMI 35.45 kg/m²

## 2025-03-14 NOTE — ANESTHESIA POSTPROCEDURE EVALUATION
Anesthesia Post Evaluation    Patient: Jailyn Braun    Procedure(s) Performed: Procedure(s) (LRB):  EGD (ESOPHAGOGASTRODUODENOSCOPY) (N/A)  COLONOSCOPY, SCREENING, LOW RISK PATIENT (N/A)    Final Anesthesia Type: general      Patient location during evaluation: PACU  Patient participation: Yes- Able to Participate  Level of consciousness: awake and alert  Post-procedure vital signs: reviewed and stable  Pain management: adequate  Airway patency: patent    PONV status at discharge: No PONV  Anesthetic complications: no      Cardiovascular status: blood pressure returned to baseline  Respiratory status: unassisted  Hydration status: euvolemic  Follow-up not needed.          Vitals Value Taken Time   /73 03/14/25 11:10   Temp 36.3 °C (97.3 °F) 03/14/25 10:45   Pulse 58 03/14/25 11:09   Resp 17 03/14/25 11:09   SpO2 99 % 03/14/25 11:09   Vitals shown include unfiled device data.      Event Time   Out of Recovery 11:00:00         Pain/Nikos Score: Nikos Score: 10 (3/14/2025 11:00 AM)

## 2025-03-18 ENCOUNTER — RESULTS FOLLOW-UP (OUTPATIENT)
Dept: GASTROENTEROLOGY | Facility: CLINIC | Age: 70
End: 2025-03-18

## 2025-03-18 ENCOUNTER — TELEPHONE (OUTPATIENT)
Dept: GASTROENTEROLOGY | Facility: CLINIC | Age: 70
End: 2025-03-18
Payer: MEDICARE

## 2025-03-18 LAB
FINAL PATHOLOGIC DIAGNOSIS: NORMAL
GROSS: NORMAL
Lab: NORMAL

## 2025-03-18 NOTE — TELEPHONE ENCOUNTER
----- Message from Cj Garland MD sent at 3/18/2025 12:41 PM CDT -----  Please notify patient, the stomach biopsies did not reveal any H.pylori.    ----- Message -----  From: Mad[facility], [facility] Interface  Sent: 3/18/2025  11:49 AM CDT  To: Cj Garland MD

## 2025-06-17 ENCOUNTER — TELEPHONE (OUTPATIENT)
Dept: PHARMACY | Facility: CLINIC | Age: 70
End: 2025-06-17
Payer: MEDICARE

## 2025-06-17 NOTE — TELEPHONE ENCOUNTER
[facility]/Population Health Chart Review & Patient Outreach Details For Medication Adherence Project    Reason for Outreach Encounter: 3rd Party payor non-compliance report ([employer], [employer], C, etc)  2.  Patient Outreach Method: Reviewed patient chart  and [employer] message  3.   Medication in question:    Hypertension Medications              losartan-hydrochlorothiazide 100-25 mg (HYZAAR) 100-25 mg per tablet Take 1 tablet by mouth.                 Losartan/hctz  last filled  12/9 for 90 day supply      4.  Reviewed and or Updates Made To: Patient Chart  5. Outreach Outcomes and/or actions taken: Sent inquiry to patient: Waiting for response  Additional Notes:

## 2025-07-29 ENCOUNTER — PATIENT MESSAGE (OUTPATIENT)
Dept: OPTOMETRY | Facility: CLINIC | Age: 70
End: 2025-07-29
Payer: MEDICARE

## 2025-08-07 ENCOUNTER — TELEPHONE (OUTPATIENT)
Dept: PAIN MEDICINE | Facility: CLINIC | Age: 70
End: 2025-08-07
Payer: MEDICARE

## 2025-08-07 NOTE — TELEPHONE ENCOUNTER
"Left a voicemail, confirming appointment location & time on 08/11/25 with GELACIO Jackson NP.     "  You  Jailyn Leonardo now (10:08 AM)     GS  Ms. Braun,     Good morning. I left a voicemail earlier today, reminding you of your appointment with GELACIO Jackson NP  on Tuesday, August 12, 2025 at 8:00 am.       We are located at:  [address] [facility] [facility] [address]  [address], [address]  [place] [address]     See you then!     Thank you,  CATHY Roberts LPN     "      "

## 2025-08-12 ENCOUNTER — OFFICE VISIT (OUTPATIENT)
Dept: PAIN MEDICINE | Facility: CLINIC | Age: 70
End: 2025-08-12
Payer: MEDICARE

## 2025-08-12 VITALS
HEART RATE: 73 BPM | DIASTOLIC BLOOD PRESSURE: 82 MMHG | BODY MASS INDEX: 34.56 KG/M2 | WEIGHT: 207.44 LBS | RESPIRATION RATE: 18 BRPM | HEIGHT: 65 IN | OXYGEN SATURATION: 100 % | TEMPERATURE: 98 F | SYSTOLIC BLOOD PRESSURE: 156 MMHG

## 2025-08-12 DIAGNOSIS — M47.816 LUMBAR SPONDYLOSIS: Primary | ICD-10-CM

## 2025-08-12 DIAGNOSIS — M79.7 FIBROMYALGIA: ICD-10-CM

## 2025-08-12 DIAGNOSIS — M47.812 CERVICAL SPONDYLOSIS: ICD-10-CM

## 2025-08-12 DIAGNOSIS — M79.7 FIBROMYALGIA: Primary | ICD-10-CM

## 2025-08-12 DIAGNOSIS — G89.4 CHRONIC PAIN SYNDROME: ICD-10-CM

## 2025-08-12 PROCEDURE — 1160F RVW MEDS BY RX/DR IN RCRD: CPT | Mod: CPTII,S$GLB,, | Performed by: NURSE PRACTITIONER

## 2025-08-12 PROCEDURE — 99204 OFFICE O/P NEW MOD 45 MIN: CPT | Mod: S$GLB,,, | Performed by: NURSE PRACTITIONER

## 2025-08-12 PROCEDURE — 3077F SYST BP >= 140 MM HG: CPT | Mod: CPTII,S$GLB,, | Performed by: NURSE PRACTITIONER

## 2025-08-12 PROCEDURE — 3008F BODY MASS INDEX DOCD: CPT | Mod: CPTII,S$GLB,, | Performed by: NURSE PRACTITIONER

## 2025-08-12 PROCEDURE — 1101F PT FALLS ASSESS-DOCD LE1/YR: CPT | Mod: CPTII,S$GLB,, | Performed by: NURSE PRACTITIONER

## 2025-08-12 PROCEDURE — 99999 PR PBB SHADOW E&M-EST. PATIENT-LVL IV: CPT | Mod: PBBFAC,HCWC,, | Performed by: NURSE PRACTITIONER

## 2025-08-12 PROCEDURE — 1125F AMNT PAIN NOTED PAIN PRSNT: CPT | Mod: CPTII,S$GLB,, | Performed by: NURSE PRACTITIONER

## 2025-08-12 PROCEDURE — 1159F MED LIST DOCD IN RCRD: CPT | Mod: CPTII,S$GLB,, | Performed by: NURSE PRACTITIONER

## 2025-08-12 PROCEDURE — 3079F DIAST BP 80-89 MM HG: CPT | Mod: CPTII,S$GLB,, | Performed by: NURSE PRACTITIONER

## 2025-08-12 PROCEDURE — 3288F FALL RISK ASSESSMENT DOCD: CPT | Mod: CPTII,S$GLB,, | Performed by: NURSE PRACTITIONER

## 2025-08-12 RX ORDER — PREGABALIN 50 MG/1
50 CAPSULE ORAL 2 TIMES DAILY
Qty: 60 CAPSULE | Refills: 1 | Status: SHIPPED | OUTPATIENT
Start: 2025-08-12 | End: 2025-10-11

## 2025-08-13 ENCOUNTER — CLINICAL SUPPORT (OUTPATIENT)
Dept: REHABILITATION | Facility: OTHER | Age: 70
End: 2025-08-13
Payer: MEDICARE

## 2025-08-13 DIAGNOSIS — M47.816 LUMBAR SPONDYLOSIS: ICD-10-CM

## 2025-08-13 DIAGNOSIS — M47.812 CERVICAL SPONDYLOSIS: ICD-10-CM

## 2025-08-13 DIAGNOSIS — M79.7 FIBROMYALGIA: ICD-10-CM

## 2025-08-13 PROCEDURE — 97162 PT EVAL MOD COMPLEX 30 MIN: CPT | Mod: PN

## 2025-08-27 ENCOUNTER — TELEPHONE (OUTPATIENT)
Dept: PAIN MEDICINE | Facility: CLINIC | Age: 70
End: 2025-08-27
Payer: MEDICARE

## 2025-08-27 ENCOUNTER — NURSE TRIAGE (OUTPATIENT)
Dept: ADMINISTRATIVE | Facility: CLINIC | Age: 70
End: 2025-08-27
Payer: MEDICARE

## 2025-08-28 ENCOUNTER — OFFICE VISIT (OUTPATIENT)
Dept: OPTOMETRY | Facility: CLINIC | Age: 70
End: 2025-08-28
Payer: MEDICARE

## 2025-08-28 ENCOUNTER — OFFICE VISIT (OUTPATIENT)
Dept: PAIN MEDICINE | Facility: CLINIC | Age: 70
End: 2025-08-28
Payer: MEDICARE

## 2025-08-28 VITALS
HEART RATE: 74 BPM | RESPIRATION RATE: 18 BRPM | WEIGHT: 205 LBS | SYSTOLIC BLOOD PRESSURE: 120 MMHG | HEIGHT: 65 IN | DIASTOLIC BLOOD PRESSURE: 68 MMHG | OXYGEN SATURATION: 100 % | TEMPERATURE: 98 F | BODY MASS INDEX: 34.16 KG/M2

## 2025-08-28 DIAGNOSIS — H52.13 MYOPIA WITH PRESBYOPIA OF BOTH EYES: ICD-10-CM

## 2025-08-28 DIAGNOSIS — H52.4 MYOPIA WITH PRESBYOPIA OF BOTH EYES: ICD-10-CM

## 2025-08-28 DIAGNOSIS — M79.7 FIBROMYALGIA: Primary | ICD-10-CM

## 2025-08-28 DIAGNOSIS — G89.4 CHRONIC PAIN SYNDROME: ICD-10-CM

## 2025-08-28 DIAGNOSIS — M47.816 LUMBAR SPONDYLOSIS: ICD-10-CM

## 2025-08-28 DIAGNOSIS — M47.812 CERVICAL SPONDYLOSIS: ICD-10-CM

## 2025-08-28 DIAGNOSIS — Z01.00 EYE EXAM, ROUTINE: Primary | ICD-10-CM

## 2025-08-28 DIAGNOSIS — Z97.3 WEARS CONTACT LENSES: ICD-10-CM

## 2025-08-28 PROCEDURE — 1101F PT FALLS ASSESS-DOCD LE1/YR: CPT | Mod: CPTII,HCWC,S$GLB, | Performed by: ANESTHESIOLOGY

## 2025-08-28 PROCEDURE — 3008F BODY MASS INDEX DOCD: CPT | Mod: CPTII,HCWC,S$GLB, | Performed by: ANESTHESIOLOGY

## 2025-08-28 PROCEDURE — 1159F MED LIST DOCD IN RCRD: CPT | Mod: CPTII,HCWC,S$GLB, | Performed by: ANESTHESIOLOGY

## 2025-08-28 PROCEDURE — 99999 PR PBB SHADOW E&M-EST. PATIENT-LVL II: CPT | Mod: PBBFAC,HCWC,, | Performed by: OPTOMETRIST

## 2025-08-28 PROCEDURE — 3288F FALL RISK ASSESSMENT DOCD: CPT | Mod: CPTII,HCWC,S$GLB, | Performed by: ANESTHESIOLOGY

## 2025-08-28 PROCEDURE — 1160F RVW MEDS BY RX/DR IN RCRD: CPT | Mod: CPTII,HCWC,S$GLB, | Performed by: ANESTHESIOLOGY

## 2025-08-28 PROCEDURE — 3078F DIAST BP <80 MM HG: CPT | Mod: CPTII,HCWC,S$GLB, | Performed by: ANESTHESIOLOGY

## 2025-08-28 PROCEDURE — 1125F AMNT PAIN NOTED PAIN PRSNT: CPT | Mod: CPTII,HCWC,S$GLB, | Performed by: ANESTHESIOLOGY

## 2025-08-28 PROCEDURE — 3074F SYST BP LT 130 MM HG: CPT | Mod: CPTII,HCWC,S$GLB, | Performed by: ANESTHESIOLOGY

## 2025-08-28 PROCEDURE — 99999 PR PBB SHADOW E&M-EST. PATIENT-LVL IV: CPT | Mod: PBBFAC,HCWC,, | Performed by: ANESTHESIOLOGY

## 2025-08-28 PROCEDURE — 92014 COMPRE OPH EXAM EST PT 1/>: CPT | Mod: HCWC,,, | Performed by: OPTOMETRIST

## 2025-08-28 PROCEDURE — 99214 OFFICE O/P EST MOD 30 MIN: CPT | Mod: HCWC,GC,S$GLB, | Performed by: ANESTHESIOLOGY

## 2025-08-28 PROCEDURE — G2211 COMPLEX E/M VISIT ADD ON: HCPCS | Mod: HCWC,S$GLB,, | Performed by: ANESTHESIOLOGY

## 2025-08-28 PROCEDURE — 92015 DETERMINE REFRACTIVE STATE: CPT | Mod: HCWC,,, | Performed by: OPTOMETRIST

## 2025-09-02 ENCOUNTER — CLINICAL SUPPORT (OUTPATIENT)
Dept: REHABILITATION | Facility: OTHER | Age: 70
End: 2025-09-02
Payer: MEDICARE

## 2025-09-02 DIAGNOSIS — M47.812 CERVICAL SPONDYLOSIS: ICD-10-CM

## 2025-09-02 DIAGNOSIS — M79.7 FIBROMYALGIA: ICD-10-CM

## 2025-09-02 DIAGNOSIS — M47.816 LUMBAR SPONDYLOSIS: Primary | ICD-10-CM

## 2025-09-02 PROCEDURE — 97110 THERAPEUTIC EXERCISES: CPT | Mod: HCWC,PN,CQ

## 2025-09-02 PROCEDURE — 97112 NEUROMUSCULAR REEDUCATION: CPT | Mod: HCWC,PN,CQ
